# Patient Record
Sex: FEMALE | Race: WHITE | NOT HISPANIC OR LATINO | Employment: FULL TIME | URBAN - METROPOLITAN AREA
[De-identification: names, ages, dates, MRNs, and addresses within clinical notes are randomized per-mention and may not be internally consistent; named-entity substitution may affect disease eponyms.]

---

## 2021-11-01 ENCOUNTER — E-VISIT (OUTPATIENT)
Dept: FAMILY MEDICINE CLINIC | Facility: TELEHEALTH | Age: 31
End: 2021-11-01

## 2021-11-01 NOTE — E-VISIT ESCALATED
Patient escalated   Provider June Velazquez chose to escalate patient to another level of care because: Desired treatments not available   Patient was sent the following message:   Please schedule a video visit so appropriate treatment can be prescribed. just follow the link to set up a visit   What to do now:    Please set up a video visit  .   You won't be charged for your eVisit. If you paid with a credit card, the charge will be reversed.   Chief Complaint: Low back pain   Patient introduction   Patient is 31-year-old female reporting pain on both sides of lower back. Denies back pain is work-related. Denies currently receiving disability compensation related to their back pain. Denies an open lawsuit regarding their back pain.   Patient-submitted comments:   Patient writes: I think I have pulled / strained a muscle in my lower back. I felt the pain immediately after bending over and picking up my 40lb little boy. I am able to move a little but the pain is making me extremely uncomfortable. The act of moving from a laying to sitting up position is the worst so I've been sitting /sleeping propped up..   Patient did not request an excuse note.   General presentation   Low back pain reported for < 1 week. Patient reports severe pain that limits some everyday activities.   Patient describes feeling dull/aching pain. Pain is not worse at night or when supine. Patient suspects current symptoms were caused by heavy lifting, bending over, and a twisting motion. Regarding an additional suspected cause of their back pain, patient writes: I picked up my 40lb child. Alleviating factors include: resting/lying down and changing positions. Aggravating factors include: sitting for an extended period, standing for an extended period, physical activity/exercise, bending over, and straightening up.   Reports prior back pain but denies similarity to current symptoms. Last episode of low back pain reported to be 1-3 months ago.    Denies having had spinal/back procedures in last year.   Treatments tried for current symptoms:   Reports trying ice, heat, OTC oral analgesics, and OTC topical analgesics.   Reports trying acetaminophen.   Reports trying 1000 mg of ibuprofen bid.   Regarding an additional OTC pain medication tried, patient writes: Advil roll on.   Reports the following treatment(s) have been helpful: acetaminophen, ibuprofen, heat, and non-prescription topical pain relievers.   Patient denies the following red flags:    Pain along the spine or bony part of the back    Back pain > 6 weeks    Back pain due to significant trauma    Urinary incontinence, urinary retention, or fecal incontinence    Saddle anesthesia    History of osteoporosis    Abdominal and pelvic pain, GI symptoms, and urinary symptoms    Difficulty walking or staying upright as a result of back pain    Bacteremia, sepsis, or endocarditis    Hemodialysis within the last 2 months    Injection drug use in the last 6 months    Bilateral leg weakness    Fever, severe fatigue, unintentional weight loss, or drenching night sweats    History of cancer   Self-exam:    Patient denies radicular pain.    Patient can squat down and rise up to a standing position without difficulty (L4 motor screening).    Patient can walk on heels for 10 steps but they struggle to keep right toes up (L5 motor screening).    Patient can walk on toes for 10 steps without difficulty (S1 motor screening).   Prognosis and risk stratification:   Patient is not interested in physical therapy.   Pregnancy/menstrual status/breastfeeding:   Denies being pregnant. Denies breastfeeding. Regarding last menstrual period, patient writes: 10/25-10/31/2021.   Current medications   Reports taking Clarispray.   Medication allergies   None.   Medication contraindication review   Denies history of arrhythmias, heart block, and conduction disorders; ASA- or NSAID-induced asthma or urticaria; aspirin-exacerbated  respiratory disease (AERD); congestive heart failure; hyperthyroidism; recent CABG surgery; and recent myocardial infarction.   Past medical history   Immune conditions: Patient denies immunocompromising conditions.   Assessment:   Patient determined to need a level of care not appropriate to be delivered through eVisit.   Plan:   Patient informed of need to seek in-person care      ----------   Electronically signed by NEYMAR Gonzales on 2021-11-01 at 03:34AM   ----------   Patient Interview Transcript:   Where on your lower back do you feel pain? This interview treats low back pain only. Select one.    Both sides   Not selected:    Right side    Left side    Along the spine or bony part of my back   Since your back pain started, have you had any of these stomach- or bladder-related symptoms? Select all that apply.    None of these   Not selected:    Abdominal pain or discomfort    Nausea    Vomiting    Pain that's worse after eating    Pelvic or lower abdominal pain    Burning with urination    Frequent urination    Blood in your urine   Do any of these statements apply to you? Select all that apply.    None of the above   Not selected:    I've been taking oral steroids such as prednisone for a long time    I have a bruise or scrape on my spine where the pain is    I have osteoporosis   How long have you had your current episode of back pain? Select one.    Less than 1 week   Not selected:    1 to 2 weeks    3 to 4 weeks    5 to 6 weeks    More than 6 weeks   What does the pain feel like? Select one.    Dull, aching   Not selected:    Sharp, shooting, stabbing, or burning    Neither of the above   How severe is your back pain? Think about how the pain affects your everyday activities. On a scale of 1 to 10, for example, how difficult is it to get out of bed, get dressed, shower, or go to work or school? Select one.    Severe, 7 to 9; my pain is distressing, and it limits me from doing some everyday activities    Not selected:    Mild, 1 to 3; my pain is noticeable and distracting, but I am still able to do everyday activities    Moderate, 4 to 6; my pain is strong, and it makes everyday activities uncomfortable    Unbearable, 10+; my pain is so intense that it keeps me from doing almost all everyday activities   Does the pain travel down from your lower back to your buttock, thigh, lower leg, or foot? Select one.    No   Not selected:    Yes, it travels down my right side    Yes, it travels down my left side    Yes, it travels down both sides   Since your back pain started, have you had numbness or loss of feeling in the pattern as shown?    No   Not selected:    Yes    I have an underlying condition that causes chronic numbness or loss of feeling in that area   Does your back pain get worse at night or when you're lying down? Select one.    No   Not selected:    Yes   Do any of these make your back pain worse? Select all that apply.    Sitting in one position for a long time    Being on my feet for a long time    Physical activity or exercise    Bending over    Standing up from a bent over position   Not selected:    Twisting to the side    Leaning to the side    Coughing or sneezing    None of the above   Do any of these help your back feel better? Select all that apply.    Resting or lying down    Frequently changing positions   Not selected:    Physical activity or exercise    Stretching    None of the above   Have you tried any of these to relieve your low back pain? Select all that apply.    Ice    Heat    Non-prescription oral pain relievers (Advil, Aleve, aspirin, or Tylenol)    Non-prescription topical pain relievers (Icy Hot, BenGay, or Vicks VapoRub)   Not selected:    Massage    Acupuncture    Chiropractic treatment    None of the above   Since your back pain started, have you stumbled or fallen because of problems with balance or coordination? Select one.    No   Not selected:    Yes    I have an underlying  condition that prevents me from standing or walking    I have an underlying condition that causes problems with my balance or coordination   Along with your back pain, have you noticed a loss of strength in your legs? Select one.    No   Not selected:    Yes, but only in one leg    Yes, in both legs    I have limited or no strength in my legs because of a chronic underlying condition   Since your back pain started, have you had any of these symptoms? Back pain doesn't usually come with other symptoms that affect your whole body. Select all that apply.    None of these   Not selected:    Unexplained fever    Severe fatigue that doesn't improve with rest    Unintentional weight loss    Drenching night sweats   Since your back pain began, have you noticed any loss of bowel or bladder function? This includes urinating or having a bowel movement when you didn't mean to. It also includes feeling like you can't urinate or empty your bladder all the way. Select one.    No   Not selected:    Yes   Can you squat down and then rise to a standing position?    Yes, with ease   Not selected:    Yes, but my right leg seems weaker than my left leg    Yes, but my left leg seems weaker than my right leg    No, my right leg is too weak    No, my left leg is too weak    No, both my legs are too weak   Can you walk on your heels for at least 10 steps?    Yes, but it's hard to keep my right toes up   Not selected:    Yes, with ease    Yes, but it's hard to keep my left toes up    No, because I can't keep my right toes up high enough    No, because I can't keep my left toes up high enough    No, because I can't keep my toes up on both the right and left sides   Can you walk on your toes for at least 10 steps?    Yes, with ease   Not selected:    Yes, but it's harder to keep my right heel up    Yes, but it's harder to keep my left heel up    No, because I can't keep my right heel up high enough    No, because I can't keep my left heel up  "high enough    No, because I can't keep my heels up on both my right and left sides   The next few questions will ask you about what may have caused your back pain. Was your back pain triggered by any of these activities? Select all that apply.    Heavy lifting    Bending over    A twisting motion    Other (specify): I picked up my 40lb child   Not selected:    A new sport or activity    An awkward position    Intense exercise    No, nothing triggered my back pain   Is your back pain the result of a serious injury, fall, or accident? A serious injury or fall might include falling off a ladder more than 10 feet high or being involved in a car accident more serious than a \"fender molina.\" Select one.    No   Not selected:    Yes   Is your back pain work-related? This includes injuries suffered at work and symptoms caused by repetitive activities at work (such as overuse injuries). Select one.    No   Not selected:    Yes   Within the last year, have you had any medical procedures done on your spine or back? Examples include back or spine surgeries, spinal injections, epidural injections, and epidural catheter placement. Select one.    No   Not selected:    Yes   Have you recently been treated for bacteremia, sepsis, or endocarditis? Bacteremia is a condition where bacteria is found in the blood. Sepsis can be a serious, life-threatening complication of bacteremia. Endocarditis is an infection of the lining of the heart and heart valves. Select one.    No   Not selected:    Yes   Have you had hemodialysis within the last 2 months? Hemodialysis (also known as dialysis) is a treatment for kidney failure. Select one.    No   Not selected:    Yes   In the last 6 months, have you used any injection drugs, such as heroin, cocaine, crystal meth, amphetamines, or opiates? Using injection drugs can put you at risk for serious infections of the spine and surrounding tissues. Your response to this question will only be shared " with your health provider. Select one.    No   Not selected:    Yes   Have you had low back pain before? Select one.    Yes, but it felt different   Not selected:    Yes, and it felt similar    No   When was the last time you had low back pain? Select one.    1 to 3 months ago   Not selected:    Within the last month    4 to 6 months ago    More than 6 months ago   Are you currently receiving any disability compensation related to your back pain? If so, you may be instructed to follow up with your treating physician. Select one.    No   Not selected:    Yes   Are you currently involved in a lawsuit associated with your back pain? Have you filed any legal action regarding your back pain? If so, you may be instructed to follow up with your treating physician. Select one.    No   Not selected:    Yes   Are you pregnant? Select one.    No   Not selected:    Yes   When was your last menstrual period? If you don't currently have periods or no longer have periods, please briefly explain.    10/25-10/31/2021   Are you breastfeeding? Select one.    No   Not selected:    Yes   Do you have any of these conditions that can affect the immune system? Scroll to see all options. Select all that apply.    None of these   Not selected:    History of bone marrow transplant    Chronic kidney disease    Chronic liver disease (including cirrhosis)    HIV/AIDS    Inflammatory bowel disease (Crohn's disease or ulcerative colitis)    Lupus    Moderate to severe plaque psoriasis    Multiple sclerosis    Rheumatoid arthritis    Sickle cell anemia    Alpha or beta thalassemia    History of solid organ transplant (kidney, liver, or heart)    History of spleen removal    An autoimmune disorder not listed here    A condition requiring treatment with long-term use of oral steroids (such as prednisone, prednisolone, or dexamethasone)   Have you ever been diagnosed with cancer? Select one.    No   Not selected:    Yes, I have cancer now    Yes, but  I'm in remission   Have you had gastric bypass surgery? Select one.    No   Not selected:    Yes   Which of these non-prescription pain medications have you used for your symptoms? Select all that apply.    Acetaminophen (Tylenol)    Ibuprofen (Advil, Motrin)    Other (specify the medication and if it's been helpful): Advil roll on   Not selected:    Aspirin    Naproxen sodium (Aleve)    None of these   What dose of ibuprofen (Advil, Motrin) are you taking? The dose is the total number of milligrams you take each time. For example, if you take two 200 mg pills at a time, your dose is 400 mg. Select one.    1000 mg   Not selected:    81 mg    162 mg    325 mg    500 mg    650 mg    Other (please specify)   How often are you taking ibuprofen (Advil, Motrin)? Select one.    2 times a day   Not selected:    1 time a day    3 times a day    4 times a day    More than 4 times a day   Which of the treatments you've tried have been helpful? Select all that apply.    Acetaminophen (Tylenol)    Ibuprofen (Advil, Motrin)    Heat    Non-prescription topical pain relievers (Icy Hot, BenGay, or Vicks VapoRub)   Not selected:    Ice    Nothing I've tried has been helpful   Have you used any of these muscle relaxants for your symptoms? These are only available with a prescription. Select all that apply.    None of the above   Not selected:    Cyclobenzaprine hydrochloride (Flexeril)    Methocarbamol (Robaxin)    Tizanidine (Zanaflex)   If recommended by your provider, would you be willing to try physical therapy? Physical therapy may include hands-on therapy, strength and flexibility exercises, and posture training. We'll keep in mind your preferences when creating a treatment plan. Select all that apply.    No   Not selected:    Yes    Maybe, depending on the cost    Maybe, depending on the time commitment   Let's make sure the medications in your treatment plan are safe for you to take. Are you being treated for any of these  conditions? Select all that apply.    None of the above   Not selected:    Arrhythmias, heart block, or conduction disorders    Aspirin- or NSAID-induced asthma or urticaria    Aspirin-exacerbated respiratory disease (AERD)    Congestive heart failure    Hyperthyroidism    Recent coronary artery bypass graft (CABG) surgery    Recent heart attack   Have you taken any monoamine oxidase inhibitor (MAOI) medications in the last 14 days? Examples include rasagiline (Azilect), selegiline (Eldepryl, Zelapar), isocarboxazid (Marplan), phenelzine (Nardil), and tranylcypromine (Parnate). Select one.    No   Not selected:    Yes   Are you taking ciprofloxacin (Cipro) or fluvoxamine (Luvox)? Select one.    No   Not selected:    Yes   Are you taking any other medications or supplements? On the next screen, you need to list all vitamins, supplements, non-prescription medications (such as aspirin or Aleve), and prescription medications that you're taking. Select one.    Yes   Not selected:    Yes, but I'm not sure what they are    No   Have you ever had an allergic or bad reaction to any medication? Select one.    No   Not selected:    Yes   Do you need a doctor's note? A doctor's note confirms that you received care today and states when you can return to school or work. It does not contain information about your diagnosis or treatment plan. Your provider will make the final decision on whether to give you a doctor's note. Doctor's notes CANNOT be backdated. Select one.    No   Not selected:    Today only (1 day)    Today and tomorrow (2 days)    3 days   Is there anything else you'd like to tell us about your symptoms?    I think I have pulled / strained a muscle in my lower back. I felt the pain immediately after bending over and picking up my 40lb little boy. I am able to move a little but the pain is making me extremely uncomfortable. The act of moving from a laying to sitting up position is the worst so I've been sitting  /sleeping propped up.   ----------   Medical history   Medical history data does not currently exist for this patient.

## 2022-08-24 ENCOUNTER — OFFICE VISIT (OUTPATIENT)
Dept: OBSTETRICS AND GYNECOLOGY | Facility: CLINIC | Age: 32
End: 2022-08-24

## 2022-08-24 VITALS
HEIGHT: 64 IN | BODY MASS INDEX: 42.55 KG/M2 | HEART RATE: 85 BPM | SYSTOLIC BLOOD PRESSURE: 146 MMHG | WEIGHT: 249.2 LBS | DIASTOLIC BLOOD PRESSURE: 75 MMHG

## 2022-08-24 DIAGNOSIS — Z01.411 ENCOUNTER FOR GYNECOLOGICAL EXAMINATION WITH ABNORMAL FINDING: Primary | ICD-10-CM

## 2022-08-24 DIAGNOSIS — N92.1 MENORRHAGIA WITH IRREGULAR CYCLE: ICD-10-CM

## 2022-08-24 LAB
DEPRECATED RDW RBC AUTO: 41.6 FL (ref 37–54)
ERYTHROCYTE [DISTWIDTH] IN BLOOD BY AUTOMATED COUNT: 12.5 % (ref 12.3–15.4)
HBA1C MFR BLD: 8.4 % (ref 4.8–5.6)
HCT VFR BLD AUTO: 41.9 % (ref 34–46.6)
HGB BLD-MCNC: 13.6 G/DL (ref 12–15.9)
MCH RBC QN AUTO: 30 PG (ref 26.6–33)
MCHC RBC AUTO-ENTMCNC: 32.5 G/DL (ref 31.5–35.7)
MCV RBC AUTO: 92.3 FL (ref 79–97)
PLATELET # BLD AUTO: 317 10*3/MM3 (ref 140–450)
PMV BLD AUTO: 11.4 FL (ref 6–12)
RBC # BLD AUTO: 4.54 10*6/MM3 (ref 3.77–5.28)
WBC NRBC COR # BLD: 8.06 10*3/MM3 (ref 3.4–10.8)

## 2022-08-24 PROCEDURE — 87624 HPV HI-RISK TYP POOLED RSLT: CPT | Performed by: NURSE PRACTITIONER

## 2022-08-24 PROCEDURE — G0123 SCREEN CERV/VAG THIN LAYER: HCPCS | Performed by: NURSE PRACTITIONER

## 2022-08-24 PROCEDURE — 80053 COMPREHEN METABOLIC PANEL: CPT | Performed by: NURSE PRACTITIONER

## 2022-08-24 PROCEDURE — 2014F MENTAL STATUS ASSESS: CPT | Performed by: NURSE PRACTITIONER

## 2022-08-24 PROCEDURE — 84439 ASSAY OF FREE THYROXINE: CPT | Performed by: NURSE PRACTITIONER

## 2022-08-24 PROCEDURE — 83036 HEMOGLOBIN GLYCOSYLATED A1C: CPT | Performed by: NURSE PRACTITIONER

## 2022-08-24 PROCEDURE — 3008F BODY MASS INDEX DOCD: CPT | Performed by: NURSE PRACTITIONER

## 2022-08-24 PROCEDURE — 36415 COLL VENOUS BLD VENIPUNCTURE: CPT | Performed by: NURSE PRACTITIONER

## 2022-08-24 PROCEDURE — 85027 COMPLETE CBC AUTOMATED: CPT | Performed by: NURSE PRACTITIONER

## 2022-08-24 PROCEDURE — 99385 PREV VISIT NEW AGE 18-39: CPT | Performed by: NURSE PRACTITIONER

## 2022-08-24 PROCEDURE — 83002 ASSAY OF GONADOTROPIN (LH): CPT | Performed by: NURSE PRACTITIONER

## 2022-08-24 PROCEDURE — 83001 ASSAY OF GONADOTROPIN (FSH): CPT | Performed by: NURSE PRACTITIONER

## 2022-08-24 PROCEDURE — 84443 ASSAY THYROID STIM HORMONE: CPT | Performed by: NURSE PRACTITIONER

## 2022-08-24 PROCEDURE — 84270 ASSAY OF SEX HORMONE GLOBUL: CPT | Performed by: NURSE PRACTITIONER

## 2022-08-24 NOTE — PROGRESS NOTES
"  Well Woman Visit    CC: Scheduled annual well gyn visit  Chief Complaint   Patient presents with   • Gynecologic Exam     Wwe discuss irregular menses coming every two weeks        Myriad intake in the past?: No    NOT DONE TODAY due to: Patient is unsure of complete family history    Contraception:  Condoms  Social History     Substance and Sexual Activity   Sexual Activity Not Currently       HPI:   32 y.o.     Menses:   Has been bleeding about every two weeks for the past few months.  Cycle is heavy and painful most of the time.  Bled from 6/15/22 to 22, 22 to 22, 22 to 22 (spotting), 22 to 22.  Concerned for PCOS, also has new growth of facial hair.      Not currently using any birth control, has tried both OCPs and nuvaring in the past.  Does not like how it makes her feel.  No hormonal birth control since 2019.  Does not want any more children.  Has been unable to lose weight     Pain:  Moderate, would like to discuss tx options    PCP: does not have PCP  History: PMHx, Meds, Allergies, PSHx, Social Hx, and POBHx all reviewed and updated.    Pt has concerns she would like to discuss. Bleeding as detailed above    PHYSICAL EXAM:  /75 (BP Location: Left arm, Patient Position: Sitting, Cuff Size: Adult)   Pulse 85   Ht 162.6 cm (64\")   Wt 113 kg (249 lb 3.2 oz)   LMP 2022 (Exact Date)   Breastfeeding No   BMI 42.78 kg/m²  Not found.  General- NAD, alert and oriented, appropriate  Psych- Normal mood, good memory  Neck- No masses, no thyroid enlargement  CV- Regular rhythm, no murnurs  Resp- CTA to bases, no wheezes  Abdomen- Soft, non distended, non tender, no masses    Breast left-  Bilaterally symmetrical, no masses, non tender, no nipple discharge  Breast right- Bilaterally symmetrical, no masses, non tender, no nipple discharge    External genitalia- Normal female, no lesions  Urethra/meatus- Normal, no masses, non tender  Bladder- Normal, no masses, " non tender  Vagina- Normal, no atrophy, no lesions, no discharge.  Prolapse: No prolapse  Cvx- Normal, no lesions, no discharge, No cervical motion tenderness  Uterus- Normal size, shape & consistency.  Non tender, mobile, & no prolapse  Adnexa- No mass, non tender  Anus/Rectum/Perineum- Not performed    Lymphatic- No palpable neck, axillary, or groin nodes  Ext- No edema, no cyanosis    Skin- No lesions, no rashes, no acanthosis nigricans      ASSESSMENT and PLAN:    Diagnoses and all orders for this visit:    1. Encounter for gynecological examination with abnormal finding (Primary)  -     IgP, Aptima HPV  -     Ambulatory Referral to Family Practice    2. Menorrhagia with irregular cycle  Overview:  Patient is concerned for PCOS due to irregular cycles and new growth of facial hair.  Will order labs and ultrasound.   Will plan follow up after ultrasound to discuss management options.     Orders:  -     CBC (No Diff)  -     Comprehensive Metabolic Panel  -     Follicle Stimulating Hormone  -     Hemoglobin A1c  -     Luteinizing Hormone  -     Sex Horm Binding Globulin  -     T4, Free  -     TSH  -     US Non-ob Transvaginal; Future      Preventative:  • BREAST HEALTH- Monthly self breast exam importance and how to reviewed. MMG and/or MRI (prn) reviewed per society guidelines and her individual history. Screen: Not medically needed  • CERVICAL CANCER Screening- Reviewed current ASCCP guidelines for screening w and wo cotest HPV, age specific.  Screen: Updated today  • SEXUAL HEALTH: Declines STD screening  • Importance of WEIGHT MANAGEMENT, nutrition, and exercise reviewed  • VACCINATIONS Recommended: COVID and booster PRN, Flu annually, Gardisil/HPV vaccine (up to 44yo).  Importance discussed, risk being unvaccinated reviewed.  Questions answered  • Smoking status- NON SMOKER   • Will place referral to family medicine so patient can establish a medical home.    TRACK MENSES, RTO if <q21d, >7d long, heavy or  painful.    Will plan follow up after labs and ultrasound  She understands the importance of having any ordered tests to be performed in a timely fashion.  The risks of not performing them include, but are not limited to, advanced cancer stages, bone loss from osteoporosis and/or subsequent increase in morbidity and/or mortality.  She is encouraged to review her results online and/or contact or office if she has questions.     Follow Up:  Return in about 4 weeks (around 9/21/2022) for Telehealth follow up to review ultrasound and labs.    Mervin Carlos, APRN  08/24/2022    [facility] [place] [facility] [address]  [facility]  [address] [place] [address]  Dept: [phone]  Loc: [phone]

## 2022-08-25 ENCOUNTER — PATIENT MESSAGE (OUTPATIENT)
Dept: OBSTETRICS AND GYNECOLOGY | Facility: CLINIC | Age: 32
End: 2022-08-25

## 2022-08-25 LAB
ALBUMIN SERPL-MCNC: 4.3 G/DL (ref 3.5–5.2)
ALBUMIN/GLOB SERPL: 1.4 G/DL
ALP SERPL-CCNC: 80 U/L (ref 39–117)
ALT SERPL W P-5'-P-CCNC: 23 U/L (ref 1–33)
ANION GAP SERPL CALCULATED.3IONS-SCNC: 15.9 MMOL/L (ref 5–15)
AST SERPL-CCNC: 21 U/L (ref 1–32)
BILIRUB SERPL-MCNC: 0.3 MG/DL (ref 0–1.2)
BUN SERPL-MCNC: 12 MG/DL (ref 6–20)
BUN/CREAT SERPL: 19 (ref 7–25)
CALCIUM SPEC-SCNC: 9.2 MG/DL (ref 8.6–10.5)
CHLORIDE SERPL-SCNC: 99 MMOL/L (ref 98–107)
CO2 SERPL-SCNC: 21.1 MMOL/L (ref 22–29)
CREAT SERPL-MCNC: 0.63 MG/DL (ref 0.57–1)
EGFRCR SERPLBLD CKD-EPI 2021: 121.8 ML/MIN/1.73
FSH SERPL-ACNC: 6.04 MIU/ML
GLOBULIN UR ELPH-MCNC: 3 GM/DL
GLUCOSE SERPL-MCNC: 235 MG/DL (ref 65–99)
LH SERPL-ACNC: 18.1 MIU/ML
POTASSIUM SERPL-SCNC: 4.5 MMOL/L (ref 3.5–5.2)
PROT SERPL-MCNC: 7.3 G/DL (ref 6–8.5)
SODIUM SERPL-SCNC: 136 MMOL/L (ref 136–145)
T4 FREE SERPL-MCNC: 1.29 NG/DL (ref 0.93–1.7)
TSH SERPL DL<=0.05 MIU/L-ACNC: 2.03 UIU/ML (ref 0.27–4.2)

## 2022-08-26 LAB — SHBG SERPL-SCNC: 15.5 NMOL/L (ref 24.6–122)

## 2022-08-29 LAB
CYTOLOGIST CVX/VAG CYTO: NORMAL
CYTOLOGY CVX/VAG DOC CYTO: NORMAL
CYTOLOGY CVX/VAG DOC THIN PREP: NORMAL
DX ICD CODE: NORMAL
HIV 1 & 2 AB SER-IMP: NORMAL
HPV I/H RISK 4 DNA CVX QL PROBE+SIG AMP: NEGATIVE
OTHER STN SPEC: NORMAL
STAT OF ADQ CVX/VAG CYTO-IMP: NORMAL

## 2022-08-30 ENCOUNTER — OFFICE VISIT (OUTPATIENT)
Dept: FAMILY MEDICINE CLINIC | Facility: CLINIC | Age: 32
End: 2022-08-30

## 2022-08-30 VITALS
TEMPERATURE: 97.5 F | SYSTOLIC BLOOD PRESSURE: 136 MMHG | WEIGHT: 245 LBS | HEART RATE: 127 BPM | DIASTOLIC BLOOD PRESSURE: 80 MMHG | OXYGEN SATURATION: 97 % | HEIGHT: 64 IN | BODY MASS INDEX: 41.83 KG/M2

## 2022-08-30 DIAGNOSIS — E11.43 TYPE 2 DIABETES MELLITUS WITH DIABETIC AUTONOMIC NEUROPATHY, WITHOUT LONG-TERM CURRENT USE OF INSULIN: Primary | ICD-10-CM

## 2022-08-30 DIAGNOSIS — F41.9 ANXIETY: ICD-10-CM

## 2022-08-30 PROCEDURE — 99204 OFFICE O/P NEW MOD 45 MIN: CPT | Performed by: NURSE PRACTITIONER

## 2022-08-30 RX ORDER — HYDROXYZINE HYDROCHLORIDE 10 MG/1
10 TABLET, FILM COATED ORAL 3 TIMES DAILY PRN
Qty: 90 TABLET | Refills: 1 | Status: SHIPPED | OUTPATIENT
Start: 2022-08-30 | End: 2022-09-23 | Stop reason: SDUPTHER

## 2022-08-30 RX ORDER — BLOOD-GLUCOSE METER
EACH MISCELLANEOUS
Qty: 1 KIT | Refills: 0 | Status: SHIPPED | OUTPATIENT
Start: 2022-08-30

## 2022-08-30 RX ORDER — LANCETS
EACH MISCELLANEOUS
Qty: 100 EACH | Refills: 3 | Status: SHIPPED | OUTPATIENT
Start: 2022-08-30

## 2022-08-30 RX ORDER — FLUOXETINE 10 MG/1
10 CAPSULE ORAL DAILY
Qty: 30 CAPSULE | Refills: 1 | Status: SHIPPED | OUTPATIENT
Start: 2022-08-30 | End: 2022-09-23 | Stop reason: SDUPTHER

## 2022-08-30 NOTE — PROGRESS NOTES
"Chief Complaint  Diabetes (Needs labs, A1c) and Anxiety (Having panic attacks)    PHQ-9 Total Score: 19    Subjective        Past Medical History:   Diagnosis Date   • Gestational diabetes    • Heart murmur Birth    Cleared up and released from cardiologist at 18 years old   • Iron deficiency    • Kidney stone 08/2016    Have had 3 kidney stones     Social History     Tobacco Use   • Smoking status: Never Smoker   • Smokeless tobacco: Never Used   Vaping Use   • Vaping Use: Never used   Substance Use Topics   • Alcohol use: Not Currently     Comment: Social maybe 2 drinks a month   • Drug use: Never      No current outpatient medications on file prior to visit.     No current facility-administered medications on file prior to visit.      No Known Allergies   Health Maintenance Due   Topic Date Due   • ANNUAL PHYSICAL  Never done   • TDAP/TD VACCINES (1 - Tdap) Never done   • HEPATITIS C SCREENING  Never done   • COVID-19 Vaccine (3 - Booster for Moderna series) 01/22/2022      Irlanda Maradiaga presents to [facility]  Here to follow up on abnormal labs.     DM: pt states her mother is type 1 diabetic and maternal grandfather with hx of type 1 diabetes. Pt states she had gestational diabetes, last pregnancy was July 6, 2017.     Anxiety: increased stress with concerns for Autism and has upcoming testing at [facility]. Pt notes difficulty sleeping, nervous ticks (biting nails and inside of cheek), notes frequent worry, more emotional. Tried CBD oil and improved for a short time but no longer using. Notes feeling heart racing, feels clammy, and  will help calm her down with hug. Notes worsening of memory.      Pt states she has a follow up with NEYMAR Davis with Gyn on 9/21/22.       Objective   Vital Signs:   /80 (BP Location: Left arm)   Pulse (!) 127   Temp 97.5 °F (36.4 °C)   Ht 161.3 cm (63.5\")   Wt 111 kg (245 lb)   SpO2 97%   BMI 42.72 kg/m² "     Review of Systems   Neurological: Negative for numbness.      Physical Exam  Vitals reviewed.   Constitutional:       General: She is not in acute distress.     Appearance: Normal appearance. She is well-developed.   Eyes:      Conjunctiva/sclera: Conjunctivae normal.      Pupils: Pupils are equal, round, and reactive to light.   Cardiovascular:      Rate and Rhythm: Normal rate and regular rhythm.      Heart sounds: Normal heart sounds.   Pulmonary:      Effort: Pulmonary effort is normal.      Breath sounds: Normal breath sounds.   Musculoskeletal:      Right lower leg: No edema.      Left lower leg: No edema.   Skin:     General: Skin is warm and dry.   Neurological:      Mental Status: She is alert and oriented to person, place, and time.   Psychiatric:         Mood and Affect: Mood and affect normal.         Behavior: Behavior normal.         Thought Content: Thought content normal.         Judgment: Judgment normal.        Result Review :   The following data was reviewed by: NEYMAR Bah on 08/30/2022:  Common labs    Common Labsle 8/24/22 8/24/22 8/24/22    0927 0927 0927   Glucose   235 (A)   BUN   12   Creatinine   0.63   Sodium   136   Potassium   4.5   Chloride   99   Calcium   9.2   Albumin   4.30   Total Bilirubin   0.3   Alkaline Phosphatase   80   AST (SGOT)   21   ALT (SGPT)   23   WBC  8.06    Hemoglobin  13.6    Hematocrit  41.9    Platelets  317    Hemoglobin A1C 8.40 (A)     (A) Abnormal value            TSH    TSH 8/24/22   TSH 2.030           Data reviewed: Consultant notes NEYMAR Davis GYN          Assessment and Plan    Diagnoses and all orders for this visit:    1. Type 2 diabetes mellitus with diabetic autonomic neuropathy, without long-term current use of insulin (HCC) (Primary)  -     metFORMIN (Glucophage) 500 MG tablet; Take 1 tablet by mouth 2 (Two) Times a Day With Meals. Start with 1 tablet daily x 1 week then increase to 1 tablet twice daily  Dispense: 60  tablet; Refill: 2  -     glucose blood (Accu-Chek Nara Plus) test strip; Use as instructed to monitor glucose once daily  Dispense: 100 each; Refill: 3  -     Blood Glucose Monitoring Suppl (Accu-Chek Nara Plus) w/Device kit; Use as directed to check glucose once daily  Dispense: 1 kit; Refill: 0  -     Lancets (accu-chek multiclix) lancets; Use as directed to monitor glucose once daily  Dispense: 100 each; Refill: 3    2. Anxiety  -     FLUoxetine (PROzac) 10 MG capsule; Take 1 capsule by mouth Daily.  Dispense: 30 capsule; Refill: 1  -     hydrOXYzine (ATARAX) 10 MG tablet; Take 1 tablet by mouth 3 (Three) Times a Day As Needed for Anxiety.  Dispense: 90 tablet; Refill: 1        Follow Up   Return in about 4 weeks (around 9/27/2022) for Recheck.  Patient was given instructions and counseling regarding her condition or for health maintenance advice. Please see specific information pulled into the AVS if appropriate.

## 2022-09-02 ENCOUNTER — PATIENT ROUNDING (BHMG ONLY) (OUTPATIENT)
Dept: FAMILY MEDICINE CLINIC | Facility: CLINIC | Age: 32
End: 2022-09-02

## 2022-09-02 NOTE — PROGRESS NOTES
"September 2, 2022    Hello, may I speak with Irlanda Maradiaga?    My name is Claire Hoskins      I am  with [facility]  [address] [place] [address] [address] [address] [phone].    Before we get started may I verify your date of birth? 1990    I am calling to officially welcome you to our practice and ask about your recent visit. Is this a good time to talk? yes    Tell me about your visit with us. What things went well?  \"I came in early to the appointment and was seen early.\"       We're always looking for ways to make our patients' experiences even better. Do you have recommendations on ways we may improve?  no    Overall were you satisfied with your first visit to our practice? yes       I appreciate you taking the time to speak with me today. Is there anything else I can do for you? no      Thank you, and have a great day.      "

## 2022-09-08 ENCOUNTER — HOSPITAL ENCOUNTER (OUTPATIENT)
Dept: ULTRASOUND IMAGING | Facility: HOSPITAL | Age: 32
Discharge: HOME | End: 2022-09-08
Admitting: NURSE PRACTITIONER

## 2022-09-08 DIAGNOSIS — N92.1 MENORRHAGIA WITH IRREGULAR CYCLE: ICD-10-CM

## 2022-09-08 PROCEDURE — 76830 TRANSVAGINAL US NON-OB: CPT

## 2022-09-21 ENCOUNTER — OFFICE VISIT (OUTPATIENT)
Dept: OBSTETRICS AND GYNECOLOGY | Facility: CLINIC | Age: 32
End: 2022-09-21

## 2022-09-21 DIAGNOSIS — N92.1 MENORRHAGIA WITH IRREGULAR CYCLE: Primary | ICD-10-CM

## 2022-09-21 PROCEDURE — 99442 PR PHYS/QHP TELEPHONE EVALUATION 11-20 MIN: CPT | Performed by: NURSE PRACTITIONER

## 2022-09-21 NOTE — PROGRESS NOTES
GYN Visit    CC:   Chief Complaint   Patient presents with   • Follow-up       HPI:   32 y.o. Contraception or HRT: Contraception:  Natural family planning, Contraception:  Condoms    You have chosen to receive care through a telephone visit. Do you consent to use a telephone visit for your medical care today? Yes    Telehealth visit to review labs and ultrasound.  Discussed ultrasound is normal.  Reviewed A1C of 8.4.  Patient states has established care with a PCP, changed her diet, started metformin and has lost almost 20 pounds.  Feeling much better and cycle has normalized.      History: PMHx, Meds, Allergies, PSHx, Social Hx, and POBHx all reviewed and updated.    Review of Systems   Constitutional: Negative.    HENT: Negative.    Eyes: Negative.    Respiratory: Negative.    Cardiovascular: Negative.    Gastrointestinal: Negative.    Endocrine: Negative.    Genitourinary: Negative.    Musculoskeletal: Negative.    Skin: Negative.    Allergic/Immunologic: Negative.         No new allergies.   Neurological: Negative.    Hematological: Negative.    Psychiatric/Behavioral: Negative.        PHYSICAL EXAM:  There were no vitals taken for this visit.     Physical Exam - no physical exam, telehealth visit    ASSESSMENT AND PLAN:  Diagnoses and all orders for this visit:    1. Menorrhagia with irregular cycle (Primary)  Overview:  Patient is concerned for PCOS due to irregular cycles and new growth of facial hair.  Will order labs and ultrasound.   Will plan follow up after ultrasound to discuss management options.     22 - cycle has normalized as patient is working on controlling her blood glucose levels.  Ultrasound normal.        Counseling:  • TRACK MENSES, RTO if <q21d, >7d long, heavy or painful.    • Return as needed    Follow Up:  Return as needed.    This visit has been rescheduled as a phone visit to comply with patient safety concerns in accordance with CDC recommendations. Total time of  discussion was 11 minutes.        Mervin Carlos, APRN  09/21/2022    [facility] [address]  [facility]  [address] [address]  Dept: [phone]  Loc: [phone]

## 2022-09-23 ENCOUNTER — OFFICE VISIT (OUTPATIENT)
Dept: FAMILY MEDICINE CLINIC | Facility: CLINIC | Age: 32
End: 2022-09-23

## 2022-09-23 VITALS
OXYGEN SATURATION: 97 % | BODY MASS INDEX: 40.8 KG/M2 | SYSTOLIC BLOOD PRESSURE: 128 MMHG | TEMPERATURE: 97.8 F | DIASTOLIC BLOOD PRESSURE: 80 MMHG | HEIGHT: 64 IN | HEART RATE: 116 BPM | WEIGHT: 239 LBS

## 2022-09-23 DIAGNOSIS — F41.9 ANXIETY: ICD-10-CM

## 2022-09-23 DIAGNOSIS — E11.43 TYPE 2 DIABETES MELLITUS WITH DIABETIC AUTONOMIC NEUROPATHY, WITHOUT LONG-TERM CURRENT USE OF INSULIN: Primary | ICD-10-CM

## 2022-09-23 DIAGNOSIS — Z11.59 NEED FOR HEPATITIS C SCREENING TEST: ICD-10-CM

## 2022-09-23 DIAGNOSIS — E11.9 ENCOUNTER FOR DIABETIC FOOT EXAM: ICD-10-CM

## 2022-09-23 DIAGNOSIS — K59.00 CONSTIPATION, UNSPECIFIED CONSTIPATION TYPE: ICD-10-CM

## 2022-09-23 PROBLEM — Z82.79 FAMILY HISTORY OF CONGENITAL HEART DEFECT: Status: ACTIVE | Noted: 2017-02-20

## 2022-09-23 PROCEDURE — 99214 OFFICE O/P EST MOD 30 MIN: CPT | Performed by: NURSE PRACTITIONER

## 2022-09-23 RX ORDER — HYDROXYZINE HYDROCHLORIDE 10 MG/1
10 TABLET, FILM COATED ORAL 3 TIMES DAILY PRN
Qty: 270 TABLET | Refills: 1 | Status: SHIPPED | OUTPATIENT
Start: 2022-09-23

## 2022-09-23 RX ORDER — FLUOXETINE HYDROCHLORIDE 20 MG/1
20 CAPSULE ORAL DAILY
Qty: 90 CAPSULE | Refills: 1 | Status: SHIPPED | OUTPATIENT
Start: 2022-09-23 | End: 2023-03-02 | Stop reason: SDUPTHER

## 2022-09-23 NOTE — PROGRESS NOTES
Answers for HPI/ROS submitted by the patient on 9/16/2022  Please describe your symptoms.: Diabetes and anxiety  Have you had these symptoms before?: Yes  How long have you been having these symptoms?: Greater than 2 weeks  Please list any medications you are currently taking for this condition.: Prozac hydroxyzine and metformin  What is the primary reason for your visit?: Other    Chief Complaint  Anxiety (Follow up to medications)    Subjective        Past Medical History:   Diagnosis Date   • Gestational diabetes    • Heart murmur Birth    Cleared up and released from cardiologist at 18 years old   • Iron deficiency    • Kidney stone 08/2016    Have had 3 kidney stones     Social History     Tobacco Use   • Smoking status: Never Smoker   • Smokeless tobacco: Never Used   Vaping Use   • Vaping Use: Never used   Substance Use Topics   • Alcohol use: Not Currently     Comment: Social maybe 2 drinks a month   • Drug use: Never      Current Outpatient Medications on File Prior to Visit   Medication Sig   • Blood Glucose Monitoring Suppl (Accu-Chek Nara Plus) w/Device kit Use as directed to check glucose once daily   • glucose blood (Accu-Chek Nara Plus) test strip Use as instructed to monitor glucose once daily   • Lancets (accu-chek multiclix) lancets Use as directed to monitor glucose once daily   • metFORMIN (Glucophage) 500 MG tablet Take 1 tablet by mouth 2 (Two) Times a Day With Meals. Start with 1 tablet daily x 1 week then increase to 1 tablet twice daily   • [DISCONTINUED] FLUoxetine (PROzac) 10 MG capsule Take 1 capsule by mouth Daily.   • [DISCONTINUED] hydrOXYzine (ATARAX) 10 MG tablet Take 1 tablet by mouth 3 (Three) Times a Day As Needed for Anxiety.     No current facility-administered medications on file prior to visit.      No Known Allergies   Health Maintenance Due   Topic Date Due   • URINE MICROALBUMIN  Never done   • ANNUAL PHYSICAL  Never done   • Pneumococcal Vaccine 0-64 (1 - PCV) Never done  "  • Hepatitis B (1 of 3 - Risk 3-dose series) Never done   • TDAP/TD VACCINES (1 - Tdap) Never done   • COVID-19 Vaccine (3 - Booster for Moderna series) 10/17/2021   • HEPATITIS C SCREENING  Never done   • DIABETIC EYE EXAM  Never done      Irlanda Maradiaga presents to [facility]  History of Present Illness  Here to follow up on new start of Prozac.     Anxiety: At first felt like the medication was helping and now not as much. Notes vivid dreams but not nightmares.  has noted more relaxed. Feels more calm. Taking hydroxyzine about 2x/day and helps mind relax at night to fall asleep. Denies drowsiness with hydroxyzine.     Fasting glucose is now 118-120; prior to metformin it was >200. Eating lower carb.     Constipation: Patient notes that she has been more constipated with recent diet changes.  Plans to start a stool softener.  Discussed with patient that she can take 17 g of MiraLAX daily to help with constipation.    Followed up with NEYMAR Dias and states her menstrual cycle has returned to normal since making dietary changes. Reproductive organs were normal. Walking 2miles or 1 hour/day.       Objective   Vital Signs:   /80 (BP Location: Left arm)   Pulse 116   Temp 97.8 °F (36.6 °C)   Ht 161.3 cm (63.5\")   Wt 108 kg (239 lb)   SpO2 97%   BMI 41.67 kg/m²     Review of Systems   Physical Exam  Vitals reviewed.   Constitutional:       General: She is not in acute distress.     Appearance: Normal appearance. She is well-developed.   HENT:      Head: Normocephalic and atraumatic.      Right Ear: External ear normal.      Left Ear: External ear normal.   Eyes:      Conjunctiva/sclera: Conjunctivae normal.      Pupils: Pupils are equal, round, and reactive to light.   Cardiovascular:      Rate and Rhythm: Normal rate and regular rhythm.      Pulses:           Dorsalis pedis pulses are 2+ on the right side and 2+ on the left side.      Heart sounds: Normal " heart sounds.   Pulmonary:      Effort: Pulmonary effort is normal.      Breath sounds: Normal breath sounds.   Musculoskeletal:      Cervical back: Neck supple.      Right lower leg: No edema.      Left lower leg: No edema.      Right foot: No bunion.      Left foot: No bunion.   Feet:      Right foot:      Protective Sensation: 9 sites tested. 9 sites sensed.      Skin integrity: Skin integrity normal. No ulcer, blister or callus.      Toenail Condition: Right toenails are normal.      Left foot:      Protective Sensation: 9 sites tested. 9 sites sensed.      Skin integrity: Skin integrity normal. No ulcer, blister or callus.      Toenail Condition: Left toenails are normal.      Comments:      Skin:     General: Skin is warm and dry.   Neurological:      Mental Status: She is alert and oriented to person, place, and time.   Psychiatric:         Mood and Affect: Mood and affect normal.         Behavior: Behavior normal.         Thought Content: Thought content normal.         Judgment: Judgment normal.        Result Review :   The following data was reviewed by: NEYMAR Bah on 09/23/2022:  TSH    TSH 8/24/22   TSH 2.030           Most Recent A1C    HGBA1C Most Recent 8/24/22   Hemoglobin A1C 8.40 (A)   (A) Abnormal value                      Assessment and Plan    Diagnoses and all orders for this visit:    1. Type 2 diabetes mellitus with diabetic autonomic neuropathy, without long-term current use of insulin (HCC) (Primary)  -     CBC & Differential; Future  -     Comprehensive Metabolic Panel; Future  -     Hemoglobin A1c; Future  -     Lipid Panel; Future  -     MicroAlbumin, Urine, Random - Urine, Clean Catch; Future    2. Anxiety  -     FLUoxetine (PROzac) 20 MG capsule; Take 1 capsule by mouth Daily.  Dispense: 90 capsule; Refill: 1  -     hydrOXYzine (ATARAX) 10 MG tablet; Take 1 tablet by mouth 3 (Three) Times a Day As Needed for Anxiety.  Dispense: 270 tablet; Refill: 1    3. Encounter for  diabetic foot exam (HCC)    4. Need for hepatitis C screening test  -     Hepatitis C Antibody; Future    5. Constipation, unspecified constipation type    Patient to have fasting labs drawn in about 2 months and then follow-up in the office with provider to review labs and obtain refills of medications.  Patient states she plans to get an updated eye exam.    Follow Up   Return in about 2 months (around 11/23/2022).  Patient was given instructions and counseling regarding her condition or for health maintenance advice. Please see specific information pulled into the AVS if appropriate.

## 2022-10-29 DIAGNOSIS — F41.9 ANXIETY: ICD-10-CM

## 2022-10-29 DIAGNOSIS — E11.43 TYPE 2 DIABETES MELLITUS WITH DIABETIC AUTONOMIC NEUROPATHY, WITHOUT LONG-TERM CURRENT USE OF INSULIN: ICD-10-CM

## 2022-11-01 RX ORDER — FLUOXETINE 10 MG/1
10 CAPSULE ORAL DAILY
Qty: 30 CAPSULE | Refills: 1 | OUTPATIENT
Start: 2022-11-01

## 2022-12-09 ENCOUNTER — CLINICAL SUPPORT (OUTPATIENT)
Dept: FAMILY MEDICINE CLINIC | Facility: CLINIC | Age: 32
End: 2022-12-09

## 2022-12-09 DIAGNOSIS — Z11.59 NEED FOR HEPATITIS C SCREENING TEST: ICD-10-CM

## 2022-12-09 DIAGNOSIS — E11.43 TYPE 2 DIABETES MELLITUS WITH DIABETIC AUTONOMIC NEUROPATHY, WITHOUT LONG-TERM CURRENT USE OF INSULIN: ICD-10-CM

## 2022-12-09 LAB
ALBUMIN SERPL-MCNC: 4.4 G/DL (ref 3.5–5.2)
ALBUMIN UR-MCNC: <1.2 MG/DL
ALBUMIN/GLOB SERPL: 1.7 G/DL
ALP SERPL-CCNC: 86 U/L (ref 39–117)
ALT SERPL W P-5'-P-CCNC: 35 U/L (ref 1–33)
ANION GAP SERPL CALCULATED.3IONS-SCNC: 11 MMOL/L (ref 5–15)
AST SERPL-CCNC: 19 U/L (ref 1–32)
BASOPHILS # BLD AUTO: 0.05 10*3/MM3 (ref 0–0.2)
BASOPHILS NFR BLD AUTO: 0.6 % (ref 0–1.5)
BILIRUB SERPL-MCNC: 0.3 MG/DL (ref 0–1.2)
BUN SERPL-MCNC: 8 MG/DL (ref 6–20)
BUN/CREAT SERPL: 12.3 (ref 7–25)
CALCIUM SPEC-SCNC: 9.2 MG/DL (ref 8.6–10.5)
CHLORIDE SERPL-SCNC: 99 MMOL/L (ref 98–107)
CHOLEST SERPL-MCNC: 208 MG/DL (ref 0–200)
CO2 SERPL-SCNC: 27 MMOL/L (ref 22–29)
CREAT SERPL-MCNC: 0.65 MG/DL (ref 0.57–1)
DEPRECATED RDW RBC AUTO: 40.9 FL (ref 37–54)
EGFRCR SERPLBLD CKD-EPI 2021: 120.9 ML/MIN/1.73
EOSINOPHIL # BLD AUTO: 0.12 10*3/MM3 (ref 0–0.4)
EOSINOPHIL NFR BLD AUTO: 1.4 % (ref 0.3–6.2)
ERYTHROCYTE [DISTWIDTH] IN BLOOD BY AUTOMATED COUNT: 12.4 % (ref 12.3–15.4)
GLOBULIN UR ELPH-MCNC: 2.6 GM/DL
GLUCOSE SERPL-MCNC: 112 MG/DL (ref 65–99)
HBA1C MFR BLD: 6.6 % (ref 4.8–5.6)
HCT VFR BLD AUTO: 38.3 % (ref 34–46.6)
HCV AB SER DONR QL: NORMAL
HDLC SERPL-MCNC: 40 MG/DL (ref 40–60)
HGB BLD-MCNC: 13.2 G/DL (ref 12–15.9)
IMM GRANULOCYTES # BLD AUTO: 0.04 10*3/MM3 (ref 0–0.05)
IMM GRANULOCYTES NFR BLD AUTO: 0.5 % (ref 0–0.5)
LDLC SERPL CALC-MCNC: 146 MG/DL (ref 0–100)
LDLC/HDLC SERPL: 3.59 {RATIO}
LYMPHOCYTES # BLD AUTO: 2.01 10*3/MM3 (ref 0.7–3.1)
LYMPHOCYTES NFR BLD AUTO: 23.2 % (ref 19.6–45.3)
MCH RBC QN AUTO: 31.1 PG (ref 26.6–33)
MCHC RBC AUTO-ENTMCNC: 34.5 G/DL (ref 31.5–35.7)
MCV RBC AUTO: 90.1 FL (ref 79–97)
MONOCYTES # BLD AUTO: 0.62 10*3/MM3 (ref 0.1–0.9)
MONOCYTES NFR BLD AUTO: 7.2 % (ref 5–12)
NEUTROPHILS NFR BLD AUTO: 5.81 10*3/MM3 (ref 1.7–7)
NEUTROPHILS NFR BLD AUTO: 67.1 % (ref 42.7–76)
NRBC BLD AUTO-RTO: 0 /100 WBC (ref 0–0.2)
PLATELET # BLD AUTO: 272 10*3/MM3 (ref 140–450)
PMV BLD AUTO: 11.1 FL (ref 6–12)
POTASSIUM SERPL-SCNC: 4.2 MMOL/L (ref 3.5–5.2)
PROT SERPL-MCNC: 7 G/DL (ref 6–8.5)
RBC # BLD AUTO: 4.25 10*6/MM3 (ref 3.77–5.28)
SODIUM SERPL-SCNC: 137 MMOL/L (ref 136–145)
TRIGL SERPL-MCNC: 123 MG/DL (ref 0–150)
VLDLC SERPL-MCNC: 22 MG/DL (ref 5–40)
WBC NRBC COR # BLD: 8.65 10*3/MM3 (ref 3.4–10.8)

## 2022-12-09 PROCEDURE — 80061 LIPID PANEL: CPT | Performed by: NURSE PRACTITIONER

## 2022-12-09 PROCEDURE — 85025 COMPLETE CBC W/AUTO DIFF WBC: CPT | Performed by: NURSE PRACTITIONER

## 2022-12-09 PROCEDURE — 82043 UR ALBUMIN QUANTITATIVE: CPT | Performed by: NURSE PRACTITIONER

## 2022-12-09 PROCEDURE — 83036 HEMOGLOBIN GLYCOSYLATED A1C: CPT | Performed by: NURSE PRACTITIONER

## 2022-12-09 PROCEDURE — 36415 COLL VENOUS BLD VENIPUNCTURE: CPT

## 2022-12-09 PROCEDURE — 80053 COMPREHEN METABOLIC PANEL: CPT | Performed by: NURSE PRACTITIONER

## 2022-12-09 PROCEDURE — 86803 HEPATITIS C AB TEST: CPT | Performed by: NURSE PRACTITIONER

## 2022-12-09 NOTE — PROGRESS NOTES
Venipuncture Blood Specimen Collection  Venipuncture performed in left arm by Sandi Calderon with good hemostasis. Patient tolerated the procedure well without complications.   12/09/22   Sandi Calderon

## 2023-02-25 DIAGNOSIS — E11.43 TYPE 2 DIABETES MELLITUS WITH DIABETIC AUTONOMIC NEUROPATHY, WITHOUT LONG-TERM CURRENT USE OF INSULIN: ICD-10-CM

## 2023-02-25 DIAGNOSIS — F41.9 ANXIETY: ICD-10-CM

## 2023-02-25 RX ORDER — FLUOXETINE HYDROCHLORIDE 20 MG/1
20 CAPSULE ORAL DAILY
Qty: 90 CAPSULE | Refills: 1 | OUTPATIENT
Start: 2023-02-25

## 2023-03-02 ENCOUNTER — OFFICE VISIT (OUTPATIENT)
Dept: FAMILY MEDICINE CLINIC | Facility: CLINIC | Age: 33
End: 2023-03-02
Payer: COMMERCIAL

## 2023-03-02 VITALS
HEART RATE: 70 BPM | TEMPERATURE: 97.3 F | SYSTOLIC BLOOD PRESSURE: 122 MMHG | WEIGHT: 222 LBS | OXYGEN SATURATION: 100 % | BODY MASS INDEX: 38.71 KG/M2 | DIASTOLIC BLOOD PRESSURE: 78 MMHG

## 2023-03-02 DIAGNOSIS — F41.9 ANXIETY: ICD-10-CM

## 2023-03-02 DIAGNOSIS — E11.43 TYPE 2 DIABETES MELLITUS WITH DIABETIC AUTONOMIC NEUROPATHY, WITHOUT LONG-TERM CURRENT USE OF INSULIN: ICD-10-CM

## 2023-03-02 PROCEDURE — 99214 OFFICE O/P EST MOD 30 MIN: CPT | Performed by: NURSE PRACTITIONER

## 2023-03-02 RX ORDER — FLUOXETINE HYDROCHLORIDE 20 MG/1
20 CAPSULE ORAL DAILY
Qty: 90 CAPSULE | Refills: 1 | Status: SHIPPED | OUTPATIENT
Start: 2023-03-02

## 2023-03-02 RX ORDER — FLUOXETINE 10 MG/1
10 CAPSULE ORAL DAILY
Qty: 90 CAPSULE | Refills: 1 | Status: SHIPPED | OUTPATIENT
Start: 2023-03-02

## 2023-03-02 NOTE — PROGRESS NOTES
Chief Complaint  Med Refill, Anxiety, and Diabetes    PHQ-2 Total Score: 1    Subjective        Past Medical History:   Diagnosis Date   • Anxiety 08/2022   • Gestational diabetes    • Heart murmur Birth    Cleared up and released from cardiologist at 18 years old   • Iron deficiency    • Kidney stone 08/2016    Have had 3 kidney stones     Social History     Tobacco Use   • Smoking status: Never   • Smokeless tobacco: Never   Vaping Use   • Vaping Use: Never used   Substance Use Topics   • Alcohol use: Not Currently     Comment: Social maybe 2 drinks a month   • Drug use: Never      Current Outpatient Medications on File Prior to Visit   Medication Sig   • Blood Glucose Monitoring Suppl (Accu-Chek Nara Plus) w/Device kit Use as directed to check glucose once daily   • glucose blood (Accu-Chek Nara Plus) test strip Use as instructed to monitor glucose once daily   • Lancets (accu-chek multiclix) lancets Use as directed to monitor glucose once daily   • [DISCONTINUED] FLUoxetine (PROzac) 20 MG capsule Take 1 capsule by mouth Daily.   • [DISCONTINUED] metFORMIN (GLUCOPHAGE) 500 MG tablet Take 1 tablet by mouth 2 (Two) Times a Day With Meals.   • hydrOXYzine (ATARAX) 10 MG tablet Take 1 tablet by mouth 3 (Three) Times a Day As Needed for Anxiety.     No current facility-administered medications on file prior to visit.      No Known Allergies   Health Maintenance Due   Topic Date Due   • Hepatitis B (1 of 3 - 3-dose series) Never done   • Pneumococcal Vaccine 0-64 (1 - PCV) Never done   • TDAP/TD VACCINES (1 - Tdap) Never done   • COVID-19 Vaccine (3 - Booster for Moderna series) 10/17/2021   • ANNUAL PHYSICAL  Never done   • DIABETIC EYE EXAM  Never done   • INFLUENZA VACCINE  Never done      Irlanda Maradiaga presents to [facility]  History of Present Illness  Here to follow up on anxiety and diabetes.     Increased stress with moving and new puppy. Appraisal tomorrow on old home.  Waking up with sweaty legs. Pt has noted improved anxiety on Prozac but symptoms have been returning with the recent increase. Difficulty sleeping and will lie there and think. Denies feeling depressed. Increased fatigue and decreased motivation. Feels overwhelmed. Irritable.     Fasting glucose 120. 250 before her and today 222. Plans to plant a garden and walk more at the park.           Objective   Vital Signs:   /78   Pulse 70   Temp 97.3 °F (36.3 °C)   Wt 101 kg (222 lb)   SpO2 100%   BMI 38.71 kg/m²     Review of Systems   Physical Exam  Vitals reviewed.   Constitutional:       General: She is not in acute distress.     Appearance: Normal appearance. She is well-developed.   HENT:      Head: Normocephalic and atraumatic.   Eyes:      Conjunctiva/sclera: Conjunctivae normal.      Pupils: Pupils are equal, round, and reactive to light.   Cardiovascular:      Rate and Rhythm: Normal rate and regular rhythm.      Heart sounds: Normal heart sounds.   Pulmonary:      Effort: Pulmonary effort is normal.      Breath sounds: Normal breath sounds.   Musculoskeletal:      Cervical back: Neck supple.   Skin:     General: Skin is warm and dry.   Neurological:      Mental Status: She is alert and oriented to person, place, and time.   Psychiatric:         Mood and Affect: Mood and affect normal.         Behavior: Behavior normal.         Thought Content: Thought content normal.         Judgment: Judgment normal.        Result Review :   The following data was reviewed by: NEYMAR Bah on 03/02/2023:  Common labs    Common Labs 8/24/22 8/24/22 8/24/22 12/9/22 12/9/22 12/9/22 12/9/22 12/9/22    0927 0927 0927 1132 1132 1132 1132 1140   Glucose   235 (A)    112 (A)    BUN   12    8    Creatinine   0.63    0.65    Sodium   136    137    Potassium   4.5    4.2    Chloride   99    99    Calcium   9.2    9.2    Albumin   4.30    4.40    Total Bilirubin   0.3    0.3    Alkaline Phosphatase   80    86    AST  (SGOT)   21    19    ALT (SGPT)   23    35 (A)    WBC  8.06  8.65       Hemoglobin  13.6  13.2       Hematocrit  41.9  38.3       Platelets  317  272       Total Cholesterol     208 (A)      Triglycerides     123      HDL Cholesterol     40      LDL Cholesterol      146 (A)      Hemoglobin A1C 8.40 (A)     6.60 (A)     Microalbumin, Urine        <1.2   (A) Abnormal value            TSH    TSH 8/24/22   TSH 2.030                     Assessment and Plan    Diagnoses and all orders for this visit:    1. Type 2 diabetes mellitus with diabetic autonomic neuropathy, without long-term current use of insulin (HCC)  -     metFORMIN (GLUCOPHAGE) 500 MG tablet; Take 1 tablet by mouth 2 (Two) Times a Day With Meals.  Dispense: 180 tablet; Refill: 1  -     CBC & Differential; Future  -     Comprehensive Metabolic Panel; Future  -     Hemoglobin A1c; Future  -     Lipid Panel; Future    2. Anxiety  -     FLUoxetine (PROzac) 20 MG capsule; Take 1 capsule by mouth Daily.  Dispense: 90 capsule; Refill: 1  -     FLUoxetine (PROzac) 10 MG capsule; Take 1 capsule by mouth Daily.  Dispense: 90 capsule; Refill: 1    Continue to follow diet changes and regular exercise/increased activity.  Have fasting labs drawn.    Increase Prozac to 30 mg daily, patient to notify with continued difficulty with sleep and worsening of anxiety symptoms.    Follow Up   Return in about 6 months (around 9/2/2023) for Refills and fasting labs.  Patient was given instructions and counseling regarding her condition or for health maintenance advice. Please see specific information pulled into the AVS if appropriate.       Answers for HPI/ROS submitted by the patient on 3/2/2023  Please describe your symptoms.: Rx refills for metformin & prozac  Have you had these symptoms before?: Yes  How long have you been having these symptoms?: Greater than 2 weeks  What is the primary reason for your visit?: Other

## 2023-05-23 ENCOUNTER — PATIENT MESSAGE (OUTPATIENT)
Dept: FAMILY MEDICINE CLINIC | Facility: CLINIC | Age: 33
End: 2023-05-23
Payer: COMMERCIAL

## 2023-05-23 NOTE — TELEPHONE ENCOUNTER
From: Irlanda Maradiaga  To: Olga Whyte  Sent: 5/23/2023 10:21 AM EDT  Subject: Naya Espinoza! I am interested in trying out Wegovy for weight loss. Is that something you’d recommend for someone in my situation? I have lost some weight with the metformin / diet changes but I’d like to try wegovy. I still need to get my labs done that you ordered and plan on going this week! Just didn’t want you to think I forgot.

## 2023-08-11 ENCOUNTER — CLINICAL SUPPORT (OUTPATIENT)
Dept: FAMILY MEDICINE CLINIC | Facility: CLINIC | Age: 33
End: 2023-08-11
Payer: COMMERCIAL

## 2023-08-11 DIAGNOSIS — E11.43 TYPE 2 DIABETES MELLITUS WITH DIABETIC AUTONOMIC NEUROPATHY, WITHOUT LONG-TERM CURRENT USE OF INSULIN: ICD-10-CM

## 2023-08-11 LAB
ALBUMIN SERPL-MCNC: 4.1 G/DL (ref 3.5–5.2)
ALBUMIN/GLOB SERPL: 1.5 G/DL
ALP SERPL-CCNC: 78 U/L (ref 39–117)
ALT SERPL W P-5'-P-CCNC: 16 U/L (ref 1–33)
ANION GAP SERPL CALCULATED.3IONS-SCNC: 10.6 MMOL/L (ref 5–15)
AST SERPL-CCNC: 15 U/L (ref 1–32)
BASOPHILS # BLD AUTO: 0.06 10*3/MM3 (ref 0–0.2)
BASOPHILS NFR BLD AUTO: 0.7 % (ref 0–1.5)
BILIRUB SERPL-MCNC: 0.4 MG/DL (ref 0–1.2)
BUN SERPL-MCNC: 12 MG/DL (ref 6–20)
BUN/CREAT SERPL: 18.2 (ref 7–25)
CALCIUM SPEC-SCNC: 9 MG/DL (ref 8.6–10.5)
CHLORIDE SERPL-SCNC: 101 MMOL/L (ref 98–107)
CHOLEST SERPL-MCNC: 225 MG/DL (ref 0–200)
CO2 SERPL-SCNC: 25.4 MMOL/L (ref 22–29)
CREAT SERPL-MCNC: 0.66 MG/DL (ref 0.57–1)
DEPRECATED RDW RBC AUTO: 40.1 FL (ref 37–54)
EGFRCR SERPLBLD CKD-EPI 2021: 120.4 ML/MIN/1.73
EOSINOPHIL # BLD AUTO: 0.07 10*3/MM3 (ref 0–0.4)
EOSINOPHIL NFR BLD AUTO: 0.8 % (ref 0.3–6.2)
ERYTHROCYTE [DISTWIDTH] IN BLOOD BY AUTOMATED COUNT: 12.4 % (ref 12.3–15.4)
GLOBULIN UR ELPH-MCNC: 2.7 GM/DL
GLUCOSE SERPL-MCNC: 130 MG/DL (ref 65–99)
HBA1C MFR BLD: 6.7 % (ref 4.8–5.6)
HCT VFR BLD AUTO: 38 % (ref 34–46.6)
HDLC SERPL-MCNC: 44 MG/DL (ref 40–60)
HGB BLD-MCNC: 12.8 G/DL (ref 12–15.9)
IMM GRANULOCYTES # BLD AUTO: 0.05 10*3/MM3 (ref 0–0.05)
IMM GRANULOCYTES NFR BLD AUTO: 0.6 % (ref 0–0.5)
LDLC SERPL CALC-MCNC: 158 MG/DL (ref 0–100)
LDLC/HDLC SERPL: 3.53 {RATIO}
LYMPHOCYTES # BLD AUTO: 2.27 10*3/MM3 (ref 0.7–3.1)
LYMPHOCYTES NFR BLD AUTO: 25.3 % (ref 19.6–45.3)
MCH RBC QN AUTO: 30.4 PG (ref 26.6–33)
MCHC RBC AUTO-ENTMCNC: 33.7 G/DL (ref 31.5–35.7)
MCV RBC AUTO: 90.3 FL (ref 79–97)
MONOCYTES # BLD AUTO: 0.69 10*3/MM3 (ref 0.1–0.9)
MONOCYTES NFR BLD AUTO: 7.7 % (ref 5–12)
NEUTROPHILS NFR BLD AUTO: 5.84 10*3/MM3 (ref 1.7–7)
NEUTROPHILS NFR BLD AUTO: 64.9 % (ref 42.7–76)
NRBC BLD AUTO-RTO: 0 /100 WBC (ref 0–0.2)
PLATELET # BLD AUTO: 283 10*3/MM3 (ref 140–450)
PMV BLD AUTO: 10.9 FL (ref 6–12)
POTASSIUM SERPL-SCNC: 4.7 MMOL/L (ref 3.5–5.2)
PROT SERPL-MCNC: 6.8 G/DL (ref 6–8.5)
RBC # BLD AUTO: 4.21 10*6/MM3 (ref 3.77–5.28)
SODIUM SERPL-SCNC: 137 MMOL/L (ref 136–145)
TRIGL SERPL-MCNC: 129 MG/DL (ref 0–150)
VLDLC SERPL-MCNC: 23 MG/DL (ref 5–40)
WBC NRBC COR # BLD: 8.98 10*3/MM3 (ref 3.4–10.8)

## 2023-08-11 PROCEDURE — 85025 COMPLETE CBC W/AUTO DIFF WBC: CPT | Performed by: NURSE PRACTITIONER

## 2023-08-11 PROCEDURE — 80053 COMPREHEN METABOLIC PANEL: CPT | Performed by: NURSE PRACTITIONER

## 2023-08-11 PROCEDURE — 83036 HEMOGLOBIN GLYCOSYLATED A1C: CPT | Performed by: NURSE PRACTITIONER

## 2023-08-11 PROCEDURE — 36415 COLL VENOUS BLD VENIPUNCTURE: CPT | Performed by: NURSE PRACTITIONER

## 2023-08-11 PROCEDURE — 80061 LIPID PANEL: CPT | Performed by: NURSE PRACTITIONER

## 2023-08-11 NOTE — PROGRESS NOTES
Venipuncture Blood Specimen Collection  Venipuncture performed in left arm by Sandi Posada with good hemostasis. Patient tolerated the procedure well without complications.   08/11/23   Sandi Posada

## 2023-08-18 ENCOUNTER — OFFICE VISIT (OUTPATIENT)
Dept: FAMILY MEDICINE CLINIC | Facility: CLINIC | Age: 33
End: 2023-08-18
Payer: COMMERCIAL

## 2023-08-18 VITALS
BODY MASS INDEX: 38.58 KG/M2 | SYSTOLIC BLOOD PRESSURE: 120 MMHG | DIASTOLIC BLOOD PRESSURE: 82 MMHG | HEART RATE: 100 BPM | HEIGHT: 64 IN | OXYGEN SATURATION: 97 % | TEMPERATURE: 97.3 F | WEIGHT: 226 LBS

## 2023-08-18 DIAGNOSIS — F41.9 ANXIETY: ICD-10-CM

## 2023-08-18 DIAGNOSIS — E11.43 TYPE 2 DIABETES MELLITUS WITH DIABETIC AUTONOMIC NEUROPATHY, WITHOUT LONG-TERM CURRENT USE OF INSULIN: Primary | ICD-10-CM

## 2023-08-18 DIAGNOSIS — E66.01 CLASS 2 SEVERE OBESITY WITH SERIOUS COMORBIDITY AND BODY MASS INDEX (BMI) OF 39.0 TO 39.9 IN ADULT, UNSPECIFIED OBESITY TYPE: ICD-10-CM

## 2023-08-18 RX ORDER — TIRZEPATIDE 2.5 MG/.5ML
2.5 INJECTION, SOLUTION SUBCUTANEOUS
Qty: 2 ML | Refills: 1 | Status: SHIPPED | OUTPATIENT
Start: 2023-08-18

## 2023-08-18 RX ORDER — FLUOXETINE HYDROCHLORIDE 20 MG/1
20 CAPSULE ORAL DAILY
Qty: 90 CAPSULE | Refills: 1 | Status: SHIPPED | OUTPATIENT
Start: 2023-08-18

## 2023-08-18 RX ORDER — HYDROXYZINE HYDROCHLORIDE 10 MG/1
10 TABLET, FILM COATED ORAL 3 TIMES DAILY PRN
Qty: 270 TABLET | Refills: 1 | Status: SHIPPED | OUTPATIENT
Start: 2023-08-18

## 2023-08-18 RX ORDER — FLUOXETINE 10 MG/1
10 CAPSULE ORAL DAILY
Qty: 90 CAPSULE | Refills: 1 | Status: SHIPPED | OUTPATIENT
Start: 2023-08-18

## 2023-08-18 NOTE — PROGRESS NOTES
"Chief Complaint  Diabetes (Refills ) and Weight Check (Discuss medication for weight loss.)    Subjective        Past Medical History:   Diagnosis Date    Anxiety 08/2022    Gestational diabetes     Heart murmur Birth    Cleared up and released from cardiologist at 18 years old    Iron deficiency     Kidney stone 08/2016    Have had 3 kidney stones     Social History     Tobacco Use    Smoking status: Never     Passive exposure: Never    Smokeless tobacco: Never   Vaping Use    Vaping Use: Never used   Substance Use Topics    Alcohol use: Not Currently     Comment: Social maybe 2 drinks a month    Drug use: Never      Current Outpatient Medications on File Prior to Visit   Medication Sig    Blood Glucose Monitoring Suppl (Accu-Chek Nara Plus) w/Device kit Use as directed to check glucose once daily    glucose blood (Accu-Chek Nara Plus) test strip Use as instructed to monitor glucose once daily    Lancets (accu-chek multiclix) lancets Use as directed to monitor glucose once daily    metFORMIN (GLUCOPHAGE) 500 MG tablet Take 1 tablet by mouth 2 (Two) Times a Day With Meals.     No current facility-administered medications on file prior to visit.      No Known Allergies   Health Maintenance Due   Topic Date Due    Hepatitis B (1 of 3 - 3-dose series) Never done    Pneumococcal Vaccine 0-64 (1 - PCV) Never done    TDAP/TD VACCINES (1 - Tdap) Never done    COVID-19 Vaccine (3 - Moderna series) 10/17/2021    ANNUAL PHYSICAL  Never done    DIABETIC EYE EXAM  Never done      Irlanda Maradiaga presents to [facility]  History of Present Illness  Here to follow up on chronic conditions and obtain refills.     DM: no issues with metformin.     Pt notes she has been cutting calories and walks daily and notes she hasn't been able to lose weight. Pt asks about Wegovy.   Objective   Vital Signs:   /82 (BP Location: Left arm)   Pulse 100   Temp 97.3 øF (36.3 øC)   Ht 161.3 cm (63.5\")   " Wt 103 kg (226 lb)   SpO2 97%   BMI 39.41 kg/mý     Review of Systems   Physical Exam  Vitals reviewed.   Constitutional:       General: She is not in acute distress.     Appearance: Normal appearance. She is well-developed. She is obese.   HENT:      Head: Normocephalic and atraumatic.   Eyes:      Conjunctiva/sclera: Conjunctivae normal.      Pupils: Pupils are equal, round, and reactive to light.   Cardiovascular:      Rate and Rhythm: Normal rate and regular rhythm.      Heart sounds: Normal heart sounds.   Pulmonary:      Effort: Pulmonary effort is normal.      Breath sounds: Normal breath sounds.   Musculoskeletal:      Cervical back: Neck supple.      Right lower leg: No edema.      Left lower leg: No edema.   Skin:     General: Skin is warm and dry.   Neurological:      Mental Status: She is alert and oriented to person, place, and time.   Psychiatric:         Mood and Affect: Mood and affect normal.         Behavior: Behavior normal.         Thought Content: Thought content normal.         Judgment: Judgment normal.      Result Review :   The following data was reviewed by: NEYMAR Bah on 08/18/2023:  Common labs          12/9/2022    11:32 12/9/2022    11:40 8/11/2023    10:50   Common Labs   Glucose 112   130    BUN 8   12    Creatinine 0.65   0.66    Sodium 137   137    Potassium 4.2   4.7    Chloride 99   101    Calcium 9.2   9.0    Albumin 4.40   4.1    Total Bilirubin 0.3   0.4    Alkaline Phosphatase 86   78    AST (SGOT) 19   15    ALT (SGPT) 35   16    WBC 8.65   8.98    Hemoglobin 13.2   12.8    Hematocrit 38.3   38.0    Platelets 272   283    Total Cholesterol 208   225    Triglycerides 123   129    HDL Cholesterol 40   44    LDL Cholesterol  146   158    Hemoglobin A1C 6.60   6.70    Microalbumin, Urine  <1.2                     Assessment and Plan    Diagnoses and all orders for this visit:    1. Type 2 diabetes mellitus with diabetic autonomic neuropathy, without long-term  current use of insulin (Primary)  -     Tirzepatide (Mounjaro) 2.5 MG/0.5ML solution pen-injector; Inject 0.5 mL under the skin into the appropriate area as directed Every 7 (Seven) Days.  Dispense: 2 mL; Refill: 1    2. Class 2 severe obesity with serious comorbidity and body mass index (BMI) of 39.0 to 39.9 in adult, unspecified obesity type    3. Anxiety  -     FLUoxetine (PROzac) 10 MG capsule; Take 1 capsule by mouth Daily.  Dispense: 90 capsule; Refill: 1  -     FLUoxetine (PROzac) 20 MG capsule; Take 1 capsule by mouth Daily.  Dispense: 90 capsule; Refill: 1  -     hydrOXYzine (ATARAX) 10 MG tablet; Take 1 tablet by mouth 3 (Three) Times a Day As Needed for Anxiety.  Dispense: 270 tablet; Refill: 1      We will send in Mounjaro for patient to start due to diabetes and obesity.  Follow-up in 1 month.    Refill Prozac and hydroxyzine.    Class 2 Severe Obesity (BMI >=35 and <=39.9). Obesity-related health conditions include the following: diabetes mellitus. Obesity is improving with lifestyle modifications. BMI is is above average; BMI management plan is completed. We discussed portion control, increasing exercise, and pharmacologic options including Mounjaro .       Follow Up   Return in about 4 weeks (around 9/15/2023) for Recheck.  Patient was given instructions and counseling regarding her condition or for health maintenance advice. Please see specific information pulled into the AVS if appropriate.

## 2023-08-24 DIAGNOSIS — E11.43 TYPE 2 DIABETES MELLITUS WITH DIABETIC AUTONOMIC NEUROPATHY, WITHOUT LONG-TERM CURRENT USE OF INSULIN: ICD-10-CM

## 2023-09-15 ENCOUNTER — OFFICE VISIT (OUTPATIENT)
Dept: FAMILY MEDICINE CLINIC | Facility: CLINIC | Age: 33
End: 2023-09-15
Payer: COMMERCIAL

## 2023-09-15 VITALS
WEIGHT: 220 LBS | TEMPERATURE: 97.1 F | SYSTOLIC BLOOD PRESSURE: 122 MMHG | BODY MASS INDEX: 38.36 KG/M2 | OXYGEN SATURATION: 99 % | HEART RATE: 103 BPM | DIASTOLIC BLOOD PRESSURE: 82 MMHG

## 2023-09-15 DIAGNOSIS — E11.69 TYPE 2 DIABETES MELLITUS WITH OTHER SPECIFIED COMPLICATION, WITHOUT LONG-TERM CURRENT USE OF INSULIN: Primary | ICD-10-CM

## 2023-09-15 DIAGNOSIS — E66.01 CLASS 2 SEVERE OBESITY WITH SERIOUS COMORBIDITY AND BODY MASS INDEX (BMI) OF 39.0 TO 39.9 IN ADULT, UNSPECIFIED OBESITY TYPE: ICD-10-CM

## 2023-09-15 RX ORDER — TIRZEPATIDE 5 MG/.5ML
5 INJECTION, SOLUTION SUBCUTANEOUS
Qty: 6 ML | Refills: 0 | Status: SHIPPED | OUTPATIENT
Start: 2023-09-15

## 2023-09-15 NOTE — PROGRESS NOTES
Answers submitted by the patient for this visit:  Other (Submitted on 9/13/2023)  Please describe your symptoms.: No symptoms - follow up on mounjaro  Have you had these symptoms before?: No  How long have you been having these symptoms?: 1-4 days  Primary Reason for Visit (Submitted on 9/13/2023)  What is the primary reason for your visit?: Other  Chief Complaint  Diabetes (Follow up medication)    Subjective        Past Medical History:   Diagnosis Date    Anxiety 08/2022    Gestational diabetes     Heart murmur Birth    Cleared up and released from cardiologist at 18 years old    Iron deficiency     Kidney stone 08/2016    Have had 3 kidney stones     Social History     Tobacco Use    Smoking status: Never     Passive exposure: Never    Smokeless tobacco: Never   Vaping Use    Vaping Use: Never used   Substance Use Topics    Alcohol use: Not Currently     Comment: Social maybe 2 drinks a month    Drug use: Never      Current Outpatient Medications on File Prior to Visit   Medication Sig    Blood Glucose Monitoring Suppl (Accu-Chek Nara Plus) w/Device kit Use as directed to check glucose once daily    FLUoxetine (PROzac) 10 MG capsule Take 1 capsule by mouth Daily.    FLUoxetine (PROzac) 20 MG capsule Take 1 capsule by mouth Daily.    glucose blood (Accu-Chek Nara Plus) test strip Use as instructed to monitor glucose once daily    hydrOXYzine (ATARAX) 10 MG tablet Take 1 tablet by mouth 3 (Three) Times a Day As Needed for Anxiety.    Lancets (accu-chek multiclix) lancets Use as directed to monitor glucose once daily    [DISCONTINUED] Tirzepatide (Mounjaro) 2.5 MG/0.5ML solution pen-injector Inject 0.5 mL under the skin into the appropriate area as directed Every 7 (Seven) Days.    [DISCONTINUED] metFORMIN (GLUCOPHAGE) 500 MG tablet TAKE 1 TABLET BY MOUTH TWICE DAILY WITH MEALS (Patient not taking: Reported on 9/15/2023)     No current facility-administered medications on file prior to visit.      No Known  Allergies   Health Maintenance Due   Topic Date Due    Hepatitis B (1 of 3 - 3-dose series) Never done    Pneumococcal Vaccine 0-64 (1 - PCV) Never done    TDAP/TD VACCINES (1 - Tdap) Never done    COVID-19 Vaccine (3 - Moderna series) 10/17/2021    ANNUAL PHYSICAL  Never done    DIABETIC EYE EXAM  Never done      Irlanda Maradiaga presents to [facility]  History of Present Illness  Here to follow up on Diabetes and new start of Mounjaro, pt notes that the first 2 weeks she did really well but the last 1-2 weeks feels more hungry and wants to munch on foods. Pt has completely changed diets, drinking plenty of water and electrolytes. Notes mild constipation so eating a couple dried pitted dates daily and stool softener and corrected issues. Would like to increase dose.   Objective   Vital Signs:   /82   Pulse 103   Temp 97.1 °F (36.2 °C)   Wt 99.8 kg (220 lb)   SpO2 99%   BMI 38.36 kg/m²     Review of Systems   Physical Exam  Vitals reviewed.   Constitutional:       General: She is not in acute distress.     Appearance: Normal appearance. She is well-developed.   HENT:      Head: Normocephalic and atraumatic.   Eyes:      Conjunctiva/sclera: Conjunctivae normal.      Pupils: Pupils are equal, round, and reactive to light.   Cardiovascular:      Rate and Rhythm: Normal rate and regular rhythm.      Heart sounds: Normal heart sounds.   Pulmonary:      Effort: Pulmonary effort is normal.      Breath sounds: Normal breath sounds.   Musculoskeletal:      Cervical back: Neck supple.      Right lower leg: No edema.      Left lower leg: No edema.   Skin:     General: Skin is warm and dry.   Neurological:      Mental Status: She is alert and oriented to person, place, and time.   Psychiatric:         Mood and Affect: Mood and affect normal.         Behavior: Behavior normal.         Thought Content: Thought content normal.         Judgment: Judgment normal.      Result Review :    The following data was reviewed by: NEYMAR Bah on 09/15/2023:  Common labs          12/9/2022    11:32 12/9/2022    11:40 8/11/2023    10:50   Common Labs   Glucose 112   130    BUN 8   12    Creatinine 0.65   0.66    Sodium 137   137    Potassium 4.2   4.7    Chloride 99   101    Calcium 9.2   9.0    Albumin 4.40   4.1    Total Bilirubin 0.3   0.4    Alkaline Phosphatase 86   78    AST (SGOT) 19   15    ALT (SGPT) 35   16    WBC 8.65   8.98    Hemoglobin 13.2   12.8    Hematocrit 38.3   38.0    Platelets 272   283    Total Cholesterol 208   225    Triglycerides 123   129    HDL Cholesterol 40   44    LDL Cholesterol  146   158    Hemoglobin A1C 6.60   6.70    Microalbumin, Urine  <1.2                   Assessment and Plan    Diagnoses and all orders for this visit:    1. Type 2 diabetes mellitus with other specified complication, without long-term current use of insulin (Primary)  -     Tirzepatide (Mounjaro) 5 MG/0.5ML solution pen-injector; Inject 0.5 mL under the skin into the appropriate area as directed Every 7 (Seven) Days.  Dispense: 6 mL; Refill: 0    2. Class 2 severe obesity with serious comorbidity and body mass index (BMI) of 39.0 to 39.9 in adult, unspecified obesity type  -     Tirzepatide (Mounjaro) 5 MG/0.5ML solution pen-injector; Inject 0.5 mL under the skin into the appropriate area as directed Every 7 (Seven) Days.  Dispense: 6 mL; Refill: 0        Increased dose of Mounjaro.  Follow-up in 3 months.          Follow Up   Return in about 3 months (around 12/15/2023) for Refills and fasting labs.  Patient was given instructions and counseling regarding her condition or for health maintenance advice. Please see specific information pulled into the AVS if appropriate.

## 2023-10-23 ENCOUNTER — TELEPHONE (OUTPATIENT)
Dept: FAMILY MEDICINE CLINIC | Facility: CLINIC | Age: 33
End: 2023-10-23
Payer: COMMERCIAL

## 2023-10-23 DIAGNOSIS — E66.01 CLASS 2 SEVERE OBESITY WITH SERIOUS COMORBIDITY AND BODY MASS INDEX (BMI) OF 39.0 TO 39.9 IN ADULT, UNSPECIFIED OBESITY TYPE: ICD-10-CM

## 2023-10-23 DIAGNOSIS — E11.69 TYPE 2 DIABETES MELLITUS WITH OTHER SPECIFIED COMPLICATION, WITHOUT LONG-TERM CURRENT USE OF INSULIN: ICD-10-CM

## 2023-10-23 RX ORDER — TIRZEPATIDE 5 MG/.5ML
5 INJECTION, SOLUTION SUBCUTANEOUS
Qty: 6 ML | Refills: 0 | Status: CANCELLED | OUTPATIENT
Start: 2023-10-23

## 2023-10-23 RX ORDER — TIRZEPATIDE 7.5 MG/.5ML
7.5 INJECTION, SOLUTION SUBCUTANEOUS
Qty: 6 ML | Refills: 0 | Status: SHIPPED | OUTPATIENT
Start: 2023-10-23

## 2023-10-23 NOTE — TELEPHONE ENCOUNTER
Caller: Irlanda Maradiaga DINAH    Relationship: Self    Best call back number: [phone]     Requested Prescriptions:   Requested Prescriptions     Pending Prescriptions Disp Refills    Tirzepatide (Mounjaro) 5 MG/0.5ML solution pen-injector 6 mL 0     Sig: Inject 0.5 mL under the skin into the appropriate area as directed Every 7 (Seven) Days.        Pharmacy where request should be sent: [id number] - [place], [place] - [address] AT [address] - [phone]  - [phone] FX     Last office visit with prescribing clinician: 9/15/2023   Last telemedicine visit with prescribing clinician: Visit date not found   Next office visit with prescribing clinician: 12/15/2023     Additional details provided by patient: PATIENT IS REQUESTING AN INCREASE IN DOSAGE TO 7.5, DOES NOT FEEL LIKE THE SUPPRESSION IS THERE, PATIENT WOULD ALSO LIKE A 90 DAYS SUPPLY PRESCRIBED.     Does the patient have less than a 3 day supply:  [] Yes  [x] No    Would you like a call back once the refill request has been completed: [] Yes [x] No    If the office needs to give you a call back, can they leave a voicemail: [] Yes [x] No    Mindi Pitts Rep   10/23/23 15:45 EDT       .”

## 2023-12-08 ENCOUNTER — OFFICE VISIT (OUTPATIENT)
Dept: FAMILY MEDICINE CLINIC | Facility: CLINIC | Age: 33
End: 2023-12-08
Payer: COMMERCIAL

## 2023-12-08 VITALS
DIASTOLIC BLOOD PRESSURE: 72 MMHG | SYSTOLIC BLOOD PRESSURE: 112 MMHG | TEMPERATURE: 97.3 F | HEART RATE: 112 BPM | WEIGHT: 209 LBS | BODY MASS INDEX: 35.68 KG/M2 | HEIGHT: 64 IN | OXYGEN SATURATION: 98 %

## 2023-12-08 DIAGNOSIS — J30.89 NON-SEASONAL ALLERGIC RHINITIS, UNSPECIFIED TRIGGER: ICD-10-CM

## 2023-12-08 DIAGNOSIS — F41.9 ANXIETY: ICD-10-CM

## 2023-12-08 DIAGNOSIS — E11.69 TYPE 2 DIABETES MELLITUS WITH OTHER SPECIFIED COMPLICATION, WITHOUT LONG-TERM CURRENT USE OF INSULIN: Primary | ICD-10-CM

## 2023-12-08 DIAGNOSIS — E11.9 ENCOUNTER FOR DIABETIC FOOT EXAM: ICD-10-CM

## 2023-12-08 LAB
ALBUMIN SERPL-MCNC: 4.4 G/DL (ref 3.5–5.2)
ALBUMIN/GLOB SERPL: 1.5 G/DL
ALP SERPL-CCNC: 75 U/L (ref 39–117)
ALT SERPL W P-5'-P-CCNC: 15 U/L (ref 1–33)
ANION GAP SERPL CALCULATED.3IONS-SCNC: 10.2 MMOL/L (ref 5–15)
AST SERPL-CCNC: 10 U/L (ref 1–32)
BACTERIA UR QL AUTO: ABNORMAL /HPF
BASOPHILS # BLD AUTO: 0.03 10*3/MM3 (ref 0–0.2)
BASOPHILS NFR BLD AUTO: 0.5 % (ref 0–1.5)
BILIRUB SERPL-MCNC: 0.3 MG/DL (ref 0–1.2)
BILIRUB UR QL STRIP: NEGATIVE
BUN SERPL-MCNC: 11 MG/DL (ref 6–20)
BUN/CREAT SERPL: 15.9 (ref 7–25)
CALCIUM SPEC-SCNC: 9 MG/DL (ref 8.6–10.5)
CHLORIDE SERPL-SCNC: 105 MMOL/L (ref 98–107)
CHOLEST SERPL-MCNC: 199 MG/DL (ref 0–200)
CLARITY UR: ABNORMAL
CO2 SERPL-SCNC: 23.8 MMOL/L (ref 22–29)
COLOR UR: YELLOW
CREAT SERPL-MCNC: 0.69 MG/DL (ref 0.57–1)
DEPRECATED RDW RBC AUTO: 40.5 FL (ref 37–54)
EGFRCR SERPLBLD CKD-EPI 2021: 118.4 ML/MIN/1.73
EOSINOPHIL # BLD AUTO: 0.02 10*3/MM3 (ref 0–0.4)
EOSINOPHIL NFR BLD AUTO: 0.4 % (ref 0.3–6.2)
ERYTHROCYTE [DISTWIDTH] IN BLOOD BY AUTOMATED COUNT: 12.4 % (ref 12.3–15.4)
GLOBULIN UR ELPH-MCNC: 2.9 GM/DL
GLUCOSE SERPL-MCNC: 102 MG/DL (ref 65–99)
GLUCOSE UR STRIP-MCNC: NEGATIVE MG/DL
HBA1C MFR BLD: 5.6 % (ref 4.8–5.6)
HCT VFR BLD AUTO: 39.3 % (ref 34–46.6)
HDLC SERPL-MCNC: 36 MG/DL (ref 40–60)
HGB BLD-MCNC: 13.3 G/DL (ref 12–15.9)
HGB UR QL STRIP.AUTO: NEGATIVE
HOLD SPECIMEN: NORMAL
HYALINE CASTS UR QL AUTO: ABNORMAL /LPF
IMM GRANULOCYTES # BLD AUTO: 0.02 10*3/MM3 (ref 0–0.05)
IMM GRANULOCYTES NFR BLD AUTO: 0.4 % (ref 0–0.5)
KETONES UR QL STRIP: ABNORMAL
LDLC SERPL CALC-MCNC: 145 MG/DL (ref 0–100)
LDLC/HDLC SERPL: 3.99 {RATIO}
LEUKOCYTE ESTERASE UR QL STRIP.AUTO: ABNORMAL
LYMPHOCYTES # BLD AUTO: 1.67 10*3/MM3 (ref 0.7–3.1)
LYMPHOCYTES NFR BLD AUTO: 29.5 % (ref 19.6–45.3)
MCH RBC QN AUTO: 30.6 PG (ref 26.6–33)
MCHC RBC AUTO-ENTMCNC: 33.8 G/DL (ref 31.5–35.7)
MCV RBC AUTO: 90.3 FL (ref 79–97)
MONOCYTES # BLD AUTO: 0.39 10*3/MM3 (ref 0.1–0.9)
MONOCYTES NFR BLD AUTO: 6.9 % (ref 5–12)
MUCOUS THREADS URNS QL MICRO: ABNORMAL /HPF
NEUTROPHILS NFR BLD AUTO: 3.53 10*3/MM3 (ref 1.7–7)
NEUTROPHILS NFR BLD AUTO: 62.3 % (ref 42.7–76)
NITRITE UR QL STRIP: NEGATIVE
NRBC BLD AUTO-RTO: 0 /100 WBC (ref 0–0.2)
PH UR STRIP.AUTO: 7 [PH] (ref 5–8)
PLATELET # BLD AUTO: 273 10*3/MM3 (ref 140–450)
PMV BLD AUTO: 11.3 FL (ref 6–12)
POTASSIUM SERPL-SCNC: 4.3 MMOL/L (ref 3.5–5.2)
PROT SERPL-MCNC: 7.3 G/DL (ref 6–8.5)
PROT UR QL STRIP: ABNORMAL
RBC # BLD AUTO: 4.35 10*6/MM3 (ref 3.77–5.28)
RBC # UR STRIP: ABNORMAL /HPF
REF LAB TEST METHOD: ABNORMAL
SODIUM SERPL-SCNC: 139 MMOL/L (ref 136–145)
SP GR UR STRIP: 1.03 (ref 1–1.03)
SQUAMOUS #/AREA URNS HPF: ABNORMAL /HPF
TRIGL SERPL-MCNC: 96 MG/DL (ref 0–150)
UROBILINOGEN UR QL STRIP: ABNORMAL
VLDLC SERPL-MCNC: 18 MG/DL (ref 5–40)
WBC # UR STRIP: ABNORMAL /HPF
WBC NRBC COR # BLD AUTO: 5.66 10*3/MM3 (ref 3.4–10.8)

## 2023-12-08 PROCEDURE — 80061 LIPID PANEL: CPT | Performed by: NURSE PRACTITIONER

## 2023-12-08 PROCEDURE — 87088 URINE BACTERIA CULTURE: CPT | Performed by: NURSE PRACTITIONER

## 2023-12-08 PROCEDURE — 85025 COMPLETE CBC W/AUTO DIFF WBC: CPT | Performed by: NURSE PRACTITIONER

## 2023-12-08 PROCEDURE — 83036 HEMOGLOBIN GLYCOSYLATED A1C: CPT | Performed by: NURSE PRACTITIONER

## 2023-12-08 PROCEDURE — 81001 URINALYSIS AUTO W/SCOPE: CPT | Performed by: NURSE PRACTITIONER

## 2023-12-08 PROCEDURE — 80053 COMPREHEN METABOLIC PANEL: CPT | Performed by: NURSE PRACTITIONER

## 2023-12-08 PROCEDURE — 87186 SC STD MICRODIL/AGAR DIL: CPT | Performed by: NURSE PRACTITIONER

## 2023-12-08 PROCEDURE — 87086 URINE CULTURE/COLONY COUNT: CPT | Performed by: NURSE PRACTITIONER

## 2023-12-08 RX ORDER — LEVOCETIRIZINE DIHYDROCHLORIDE 5 MG/1
5 TABLET, FILM COATED ORAL EVERY EVENING
Qty: 90 TABLET | Refills: 1 | Status: SHIPPED | OUTPATIENT
Start: 2023-12-08

## 2023-12-08 RX ORDER — FLUOXETINE HYDROCHLORIDE 20 MG/1
20 CAPSULE ORAL DAILY
Qty: 90 CAPSULE | Refills: 1 | Status: SHIPPED | OUTPATIENT
Start: 2023-12-08

## 2023-12-08 RX ORDER — FLUOXETINE 10 MG/1
10 CAPSULE ORAL DAILY
Qty: 90 CAPSULE | Refills: 1 | Status: SHIPPED | OUTPATIENT
Start: 2023-12-08

## 2023-12-08 RX ORDER — HYDROXYZINE HYDROCHLORIDE 10 MG/1
10 TABLET, FILM COATED ORAL 3 TIMES DAILY PRN
Qty: 270 TABLET | Refills: 1 | Status: SHIPPED | OUTPATIENT
Start: 2023-12-08

## 2023-12-08 NOTE — PROGRESS NOTES
Chief Complaint  Anxiety (Refills and labs, patient is fasting) and Allergies (Wants to start allergy medication)    Subjective        Past Medical History:   Diagnosis Date    Anxiety 08/2022    Gestational diabetes     Heart murmur Birth    Cleared up and released from cardiologist at 18 years old    Iron deficiency     Kidney stone 08/2016    Have had 3 kidney stones     Social History     Tobacco Use    Smoking status: Never     Passive exposure: Never    Smokeless tobacco: Never   Vaping Use    Vaping Use: Never used   Substance Use Topics    Alcohol use: Not Currently     Comment: Social maybe 2 drinks a month    Drug use: Never      Current Outpatient Medications on File Prior to Visit   Medication Sig    Blood Glucose Monitoring Suppl (Accu-Chek Nara Plus) w/Device kit Use as directed to check glucose once daily    glucose blood (Accu-Chek Nara Plus) test strip Use as instructed to monitor glucose once daily    Lancets (accu-chek multiclix) lancets Use as directed to monitor glucose once daily    [DISCONTINUED] FLUoxetine (PROzac) 10 MG capsule Take 1 capsule by mouth Daily.    [DISCONTINUED] FLUoxetine (PROzac) 20 MG capsule Take 1 capsule by mouth Daily.    [DISCONTINUED] hydrOXYzine (ATARAX) 10 MG tablet Take 1 tablet by mouth 3 (Three) Times a Day As Needed for Anxiety.    [DISCONTINUED] Tirzepatide (Mounjaro) 7.5 MG/0.5ML solution pen-injector Inject 0.5 mL under the skin into the appropriate area as directed Every 7 (Seven) Days.     No current facility-administered medications on file prior to visit.      No Known Allergies   Health Maintenance Due   Topic Date Due    Hepatitis B (1 of 3 - 3-dose series) Never done    ANNUAL PHYSICAL  Never done    DIABETIC EYE EXAM  Never done    COVID-19 Vaccine (3 - 2023-24 season) 09/01/2023      Irlanda Maradiaga presents to [facility]  History of Present Illness  Patient presents to the office today to follow-up on chronic  "health conditions and obtain refills of medications.  Patient is fasting for labs.    DM: pt notes she has been on Mounjaro 7.5mg and notes she is doing well with current dose without any issues. Her weight is down 17lbs over the past 4 months. She has had increased weight loss with current dose of injection. Recommended eye exam; no recent exam. Denies burning or tingling of the feet.     Anxiety:symptoms are well controlled but notes some increased stress around the holidays, no significant increase in symptoms other than one day she woke up with a panic feeling and took hydroxyzine.     AR: has been using Clarispray but states it isn't helping to control her morning congestion and runny nose. Pt states she will gag in the morning due to the drainage.     Pt does note some night sweats and clammy hands.     Objective   Vital Signs:   /72 (BP Location: Left arm)   Pulse 112   Temp 97.3 °F (36.3 °C)   Ht 161.3 cm (63.5\")   Wt 94.8 kg (209 lb)   SpO2 98%   BMI 36.44 kg/m²     Review of Systems   Physical Exam  Vitals reviewed.   Constitutional:       General: She is not in acute distress.     Appearance: Normal appearance. She is well-developed.   HENT:      Head: Normocephalic and atraumatic.   Eyes:      Conjunctiva/sclera: Conjunctivae normal.      Pupils: Pupils are equal, round, and reactive to light.   Cardiovascular:      Rate and Rhythm: Normal rate and regular rhythm.      Pulses:           Dorsalis pedis pulses are 2+ on the right side and 2+ on the left side.      Heart sounds: Normal heart sounds.   Pulmonary:      Effort: Pulmonary effort is normal.      Breath sounds: Normal breath sounds.   Musculoskeletal:      Cervical back: Neck supple.      Right lower leg: No edema.      Left lower leg: No edema.      Right foot: No bunion.      Left foot: No bunion.   Feet:      Right foot:      Protective Sensation: 9 sites tested.  9 sites sensed.      Skin integrity: Skin integrity normal. No " ulcer, blister or callus.      Toenail Condition: Right toenails are normal.      Left foot:      Protective Sensation: 9 sites tested.  9 sites sensed.      Skin integrity: Skin integrity normal. No ulcer, blister or callus.      Toenail Condition: Left toenails are normal.      Comments:      Skin:     General: Skin is warm and dry.   Neurological:      Mental Status: She is alert and oriented to person, place, and time.   Psychiatric:         Mood and Affect: Mood and affect normal.         Behavior: Behavior normal.         Thought Content: Thought content normal.         Judgment: Judgment normal.        Result Review :   The following data was reviewed by: NEYMAR Bah on 12/08/2023:  Common labs          8/11/2023    10:50   Common Labs   Glucose 130    BUN 12    Creatinine 0.66    Sodium 137    Potassium 4.7    Chloride 101    Calcium 9.0    Albumin 4.1    Total Bilirubin 0.4    Alkaline Phosphatase 78    AST (SGOT) 15    ALT (SGPT) 16    WBC 8.98    Hemoglobin 12.8    Hematocrit 38.0    Platelets 283    Total Cholesterol 225    Triglycerides 129    HDL Cholesterol 44    LDL Cholesterol  158    Hemoglobin A1C 6.70                  Assessment and Plan    Diagnoses and all orders for this visit:    1. Type 2 diabetes mellitus with other specified complication, without long-term current use of insulin (Primary)  -     Tirzepatide (Mounjaro) 7.5 MG/0.5ML solution pen-injector; Inject 0.5 mL under the skin into the appropriate area as directed Every 7 (Seven) Days.  Dispense: 6 mL; Refill: 1  -     CBC & Differential  -     Comprehensive Metabolic Panel  -     Hemoglobin A1c  -     Urinalysis With Culture If Indicated - Urine, Clean Catch  -     Lipid Panel    2. Anxiety  -     FLUoxetine (PROzac) 10 MG capsule; Take 1 capsule by mouth Daily.  Dispense: 90 capsule; Refill: 1  -     FLUoxetine (PROzac) 20 MG capsule; Take 1 capsule by mouth Daily.  Dispense: 90 capsule; Refill: 1  -     hydrOXYzine  (ATARAX) 10 MG tablet; Take 1 tablet by mouth 3 (Three) Times a Day As Needed for Anxiety.  Dispense: 270 tablet; Refill: 1    3. Non-seasonal allergic rhinitis, unspecified trigger  -     levocetirizine (XYZAL) 5 MG tablet; Take 1 tablet by mouth Every Evening.  Dispense: 90 tablet; Refill: 1    4. Encounter for diabetic foot exam      Refill current medications.  Will start patient on Xyzal nightly for allergic rhinitis in the morning congestion.     Follow Up   Return in about 6 months (around 6/8/2024) for Refills and fasting labs.  Patient was given instructions and counseling regarding her condition or for health maintenance advice. Please see specific information pulled into the AVS if appropriate.       Answers submitted by the patient for this visit:  Primary Reason for Visit (Submitted on 12/8/2023)  What is the primary reason for your visit?: Physical

## 2023-12-08 NOTE — PROGRESS NOTES
Venipuncture Blood Specimen Collection  Venipuncture performed in left arm by erica gates with good hemostasis. Patient tolerated the procedure well without complications.   12/08/23   Sandi Posada

## 2023-12-10 LAB — BACTERIA SPEC AEROBE CULT: ABNORMAL

## 2023-12-15 ENCOUNTER — PATIENT MESSAGE (OUTPATIENT)
Dept: FAMILY MEDICINE CLINIC | Facility: CLINIC | Age: 33
End: 2023-12-15

## 2023-12-15 DIAGNOSIS — R10.9 FLANK PAIN: Primary | ICD-10-CM

## 2023-12-15 RX ORDER — TAMSULOSIN HYDROCHLORIDE 0.4 MG/1
1 CAPSULE ORAL DAILY
Qty: 10 CAPSULE | Refills: 0 | Status: SHIPPED | OUTPATIENT
Start: 2023-12-15

## 2023-12-15 NOTE — TELEPHONE ENCOUNTER
From: Irlanda Maradiaga  To: Olga Isabell  Sent: 12/15/2023 1:35 PM EST  Subject: Kidney stone    Hey Olga! I have noticed a dull pain in my side for the last couple days and it feels just like a kidney stone. Is there any way you could call in a RX for flomax for me? I don’t have time to come in office with it being the holidays and busy season at work!

## 2023-12-26 DIAGNOSIS — E11.43 TYPE 2 DIABETES MELLITUS WITH DIABETIC AUTONOMIC NEUROPATHY, WITHOUT LONG-TERM CURRENT USE OF INSULIN: ICD-10-CM

## 2024-03-06 ENCOUNTER — PATIENT MESSAGE (OUTPATIENT)
Dept: FAMILY MEDICINE CLINIC | Facility: CLINIC | Age: 34
End: 2024-03-06
Payer: COMMERCIAL

## 2024-03-06 DIAGNOSIS — E11.69 TYPE 2 DIABETES MELLITUS WITH OTHER SPECIFIED COMPLICATION, WITHOUT LONG-TERM CURRENT USE OF INSULIN: ICD-10-CM

## 2024-03-08 NOTE — TELEPHONE ENCOUNTER
From: Irlanda Maradiaga  To: Olga Whyte  Sent: 3/6/2024 11:59 AM EST  Subject: Suzy Person Olga! I have been noticing that the food noise has come back this week full force and I would like to be moved up to the 10mg mounjaro. Could you submit the rx for me- I like to get the 3 month supply to save money on my prescriptions if that’s possible. Thank you!!

## 2024-03-13 ENCOUNTER — PATIENT MESSAGE (OUTPATIENT)
Dept: FAMILY MEDICINE CLINIC | Facility: CLINIC | Age: 34
End: 2024-03-13
Payer: COMMERCIAL

## 2024-03-14 ENCOUNTER — OFFICE VISIT (OUTPATIENT)
Dept: FAMILY MEDICINE CLINIC | Facility: CLINIC | Age: 34
End: 2024-03-14
Payer: COMMERCIAL

## 2024-03-14 VITALS
SYSTOLIC BLOOD PRESSURE: 132 MMHG | OXYGEN SATURATION: 98 % | BODY MASS INDEX: 34.28 KG/M2 | HEART RATE: 105 BPM | WEIGHT: 200.78 LBS | HEIGHT: 64 IN | TEMPERATURE: 97.7 F | DIASTOLIC BLOOD PRESSURE: 78 MMHG

## 2024-03-14 DIAGNOSIS — J30.89 ALLERGIC RHINITIS DUE TO OTHER ALLERGIC TRIGGER, UNSPECIFIED SEASONALITY: ICD-10-CM

## 2024-03-14 DIAGNOSIS — J20.9 ACUTE BRONCHITIS, UNSPECIFIED ORGANISM: Primary | ICD-10-CM

## 2024-03-14 PROCEDURE — 99213 OFFICE O/P EST LOW 20 MIN: CPT | Performed by: NURSE PRACTITIONER

## 2024-03-14 RX ORDER — CETIRIZINE HYDROCHLORIDE 10 MG/1
10 TABLET ORAL DAILY
Qty: 90 TABLET | Refills: 1 | Status: SHIPPED | OUTPATIENT
Start: 2024-03-14

## 2024-03-14 RX ORDER — DEXAMETHASONE 4 MG/1
4 TABLET ORAL
Qty: 5 TABLET | Refills: 0 | Status: SHIPPED | OUTPATIENT
Start: 2024-03-14

## 2024-03-14 RX ORDER — AZITHROMYCIN 250 MG/1
TABLET, FILM COATED ORAL
Qty: 6 TABLET | Refills: 0 | Status: SHIPPED | OUTPATIENT
Start: 2024-03-14

## 2024-03-14 NOTE — PROGRESS NOTES
Chief Complaint  Cough    PHQ-2 Total Score: 0    Subjective        Past Medical History:   Diagnosis Date    Anxiety 08/2022    Gestational diabetes     Heart murmur Birth    Cleared up and released from cardiologist at 18 years old    Iron deficiency     Kidney stone 08/2016    Have had 3 kidney stones     Social History     Tobacco Use    Smoking status: Never     Passive exposure: Never    Smokeless tobacco: Never   Vaping Use    Vaping status: Never Used   Substance Use Topics    Alcohol use: Not Currently     Comment: Social maybe 2 drinks a month    Drug use: Never      Current Outpatient Medications on File Prior to Visit   Medication Sig    Blood Glucose Monitoring Suppl (Accu-Chek Nara Plus) w/Device kit Use as directed to check glucose once daily    FLUoxetine (PROzac) 10 MG capsule Take 1 capsule by mouth Daily.    FLUoxetine (PROzac) 20 MG capsule Take 1 capsule by mouth Daily.    glucose blood (Accu-Chek Nara Plus) test strip Use as instructed to monitor glucose once daily    hydrOXYzine (ATARAX) 10 MG tablet Take 1 tablet by mouth 3 (Three) Times a Day As Needed for Anxiety.    Lancets (accu-chek multiclix) lancets Use as directed to monitor glucose once daily    Tirzepatide (MOUNJARO) 10 MG/0.5ML solution pen-injector pen Inject 0.5 mL under the skin into the appropriate area as directed 1 (One) Time Per Week.    [DISCONTINUED] levocetirizine (XYZAL) 5 MG tablet Take 1 tablet by mouth Every Evening.    [DISCONTINUED] tamsulosin (FLOMAX) 0.4 MG capsule 24 hr capsule Take 1 capsule by mouth Daily.     No current facility-administered medications on file prior to visit.      No Known Allergies   Health Maintenance Due   Topic Date Due    Hepatitis B (1 of 3 - 19+ 3-dose series) Never done    ANNUAL PHYSICAL  Never done    DIABETIC EYE EXAM  Never done    COVID-19 Vaccine (3 - 2023-24 season) 09/01/2023    URINE MICROALBUMIN  12/09/2023      Irlanda Maradiaga presents to [facility]  "FAMILY MEDICINE  Cough  This is a recurrent problem. The current episode started 1 to 4 weeks ago. The problem has been coming and going. The problem occurs intermittent. The cough is Productive of clear sputum. Associated symptoms include nasal congestion, rhinorrhea and wheezing. Pertinent negatives include no chest pain, chills, ear congestion, ear pain, fever, headaches, heartburn, hemoptysis, myalgias, postnasal drip, rash, sore throat, sweats or weight loss. The symptoms are aggravated by lying down.     Here with intermittent cough. Also notes drainage. Nighttime coughing episodes. Denies nausea, vomiting, or diarrhea. Notes she had a cough in Dec into Jan then resolved then returned in Feb. Pts mother in law and therapist have recently had bronchitis. Notes taking Xyzal and continues to wake up with sinus drainage.     She is down 26lbs over the past 6 months. Decreased nightsweats.     Objective   Vital Signs:   /78 (BP Location: Left arm, Patient Position: Sitting, Cuff Size: Adult)   Pulse 105   Temp 97.7 °F (36.5 °C) (Temporal)   Ht 161.3 cm (63.5\")   Wt 91.1 kg (200 lb 12.5 oz)   SpO2 98%   BMI 35.01 kg/m²     Review of Systems   Constitutional:  Negative for chills, fever and unexpected weight loss.   HENT:  Positive for rhinorrhea. Negative for ear pain, postnasal drip and sore throat.    Respiratory:  Positive for cough and wheezing. Negative for hemoptysis.    Cardiovascular:  Negative for chest pain.   Musculoskeletal:  Negative for myalgias.   Skin:  Negative for rash.      Physical Exam  Vitals reviewed.   Constitutional:       General: She is not in acute distress.     Appearance: Normal appearance. She is well-developed.   HENT:      Head: Normocephalic and atraumatic.      Right Ear: Tympanic membrane, ear canal and external ear normal.      Left Ear: Tympanic membrane, ear canal and external ear normal.      Mouth/Throat:      Pharynx: Oropharyngeal exudate present.   Eyes:      " Conjunctiva/sclera: Conjunctivae normal.      Pupils: Pupils are equal, round, and reactive to light.   Cardiovascular:      Rate and Rhythm: Normal rate and regular rhythm.      Heart sounds: Normal heart sounds.   Pulmonary:      Effort: Pulmonary effort is normal.      Breath sounds: Normal breath sounds.   Musculoskeletal:      Cervical back: Neck supple.      Right lower leg: No edema.      Left lower leg: No edema.   Skin:     General: Skin is warm and dry.   Neurological:      Mental Status: She is alert and oriented to person, place, and time.   Psychiatric:         Mood and Affect: Mood and affect normal.         Behavior: Behavior normal.         Thought Content: Thought content normal.         Judgment: Judgment normal.        Result Review :                 Assessment and Plan    Diagnoses and all orders for this visit:    1. Acute bronchitis, unspecified organism (Primary)  -     azithromycin (Zithromax Z-Kaushik) 250 MG tablet; Take 2 tablets by mouth on day 1, then 1 tablet daily on days 2-5  Dispense: 6 tablet; Refill: 0  -     dexAMETHasone (DECADRON) 4 MG tablet; Take 1 tablet by mouth Daily With Breakfast.  Dispense: 5 tablet; Refill: 0    2. Allergic rhinitis due to other allergic trigger, unspecified seasonality  -     cetirizine (zyrTEC) 10 MG tablet; Take 1 tablet by mouth Daily.  Dispense: 90 tablet; Refill: 1        Based on symptoms and recurrence of cough will treat as acute bronchitis.  Discussed with patient could also be due to allergy drainage.  Patient states understanding and is agreeable to treatment plan. No improvement in 1 week and will order Chest x-ray for patient to have done at a [facility] imaging facility in [facility].           Follow .  Discussed with patient that could also be due to allergy drainageUp   Return if symptoms worsen or fail to improve.  Patient was given instructions and counseling regarding her condition or for health maintenance advice.  Please see specific information pulled into the AVS if appropriate.

## 2024-06-17 DIAGNOSIS — F41.9 ANXIETY: ICD-10-CM

## 2024-06-18 RX ORDER — FLUOXETINE HYDROCHLORIDE 20 MG/1
20 CAPSULE ORAL DAILY
Qty: 30 CAPSULE | Refills: 0 | Status: SHIPPED | OUTPATIENT
Start: 2024-06-18

## 2024-06-18 RX ORDER — FLUOXETINE 10 MG/1
10 CAPSULE ORAL DAILY
Qty: 30 CAPSULE | Refills: 0 | Status: SHIPPED | OUTPATIENT
Start: 2024-06-18

## 2024-07-19 ENCOUNTER — OFFICE VISIT (OUTPATIENT)
Dept: FAMILY MEDICINE CLINIC | Facility: CLINIC | Age: 34
End: 2024-07-19
Payer: COMMERCIAL

## 2024-07-19 VITALS
SYSTOLIC BLOOD PRESSURE: 110 MMHG | TEMPERATURE: 97.5 F | WEIGHT: 190 LBS | DIASTOLIC BLOOD PRESSURE: 68 MMHG | BODY MASS INDEX: 33.13 KG/M2 | OXYGEN SATURATION: 99 % | HEART RATE: 99 BPM

## 2024-07-19 DIAGNOSIS — R06.2 WHEEZING: ICD-10-CM

## 2024-07-19 DIAGNOSIS — J30.89 ALLERGIC RHINITIS DUE TO OTHER ALLERGIC TRIGGER, UNSPECIFIED SEASONALITY: ICD-10-CM

## 2024-07-19 DIAGNOSIS — E11.69 TYPE 2 DIABETES MELLITUS WITH OTHER SPECIFIED COMPLICATION, WITHOUT LONG-TERM CURRENT USE OF INSULIN: ICD-10-CM

## 2024-07-19 DIAGNOSIS — E66.01 CLASS 2 SEVERE OBESITY WITH SERIOUS COMORBIDITY AND BODY MASS INDEX (BMI) OF 39.0 TO 39.9 IN ADULT, UNSPECIFIED OBESITY TYPE: ICD-10-CM

## 2024-07-19 DIAGNOSIS — R05.2 SUBACUTE COUGH: ICD-10-CM

## 2024-07-19 DIAGNOSIS — F41.9 ANXIETY: Primary | ICD-10-CM

## 2024-07-19 PROCEDURE — 99214 OFFICE O/P EST MOD 30 MIN: CPT | Performed by: NURSE PRACTITIONER

## 2024-07-20 RX ORDER — FLUOXETINE HYDROCHLORIDE 40 MG/1
40 CAPSULE ORAL DAILY
Start: 2024-07-20

## 2024-07-20 NOTE — PROGRESS NOTES
Chief Complaint  Anxiety and Diabetes    PHQ-2 Total Score: 1   See scanned PHQ completed on downtime form.     Anxiety    Diabetes      Irlanda Maradiaga is a 33 y.o. female who presents to [facility] with a past medical history of  Past Medical History:   Diagnosis Date    Anxiety 08/2022    Gestational diabetes     Heart murmur Birth    Cleared up and released from cardiologist at 18 years old    Iron deficiency     Kidney stone 08/2016    Have had 3 kidney stones       HPI  Patient presents to the office today for follow-up and refills of medications for anxiety.  Patient is currently on fluoxetine 30 mg and notes that she has been on the medication for a while.  Patient feels like it is not working as well as it once had.  Patient also notes an increase in anxiety symptoms and would like to consider an increased dose of the fluoxetine.  Patient reports she is only taken the Atarax once in the last few months.    URI symptoms: Patient states that she was sick at the beginning of the year and was treated with a round of antibiotics and steroids but continues to have a lingering cough and wheezing.  Patient notes that the wheezing is worse when trying to lie down.    Allergic rhinitis: Patient reports that the Zyrtec helps with symptoms and needs a refill of medication.    Type 2 diabetes: Patient is taking Mounjaro and notes she is still losing weight while on the medication.  Patient denies nausea, vomiting, constipation.  Patient would like to remain at her current dose.  Patient reports eating a high-protein diet and walks a lot for exercise.  Weight is down about 10 pounds.  Patient denies low glucose readings.    Objective   Vital Signs:   Vitals:    07/19/24 1602   BP: 110/68   Pulse: 99   Temp: 97.5 °F (36.4 °C)   TempSrc: Temporal   SpO2: 99%   Weight: 86.2 kg (190 lb)     Body mass index is 33.13 kg/m².    Wt Readings from Last 3 Encounters:   07/19/24 86.2 kg (190 lb)    03/14/24 91.1 kg (200 lb 12.5 oz)   12/08/23 94.8 kg (209 lb)     BP Readings from Last 3 Encounters:   07/19/24 110/68   03/14/24 132/78   12/08/23 112/72       Health Maintenance   Topic Date Due    DIABETIC EYE EXAM  Never done    Hepatitis B (1 of 3 - 19+ 3-dose series) Never done    ANNUAL PHYSICAL  Never done    COVID-19 Vaccine (3 - 2023-24 season) 09/01/2023    URINE MICROALBUMIN  12/09/2023    HEMOGLOBIN A1C  06/08/2024    Pneumococcal Vaccine 0-64 (1 of 2 - PCV) 12/08/2024 (Originally 8/16/1997)    TDAP/TD VACCINES (1 - Tdap) 12/08/2024 (Originally 8/16/2010)    INFLUENZA VACCINE  08/01/2024    BMI FOLLOWUP  08/18/2024    DIABETIC FOOT EXAM  12/08/2024    PAP SMEAR  08/24/2025    HEPATITIS C SCREENING  Completed       Physical Exam  Vitals reviewed.   Constitutional:       General: She is not in acute distress.     Appearance: Normal appearance. She is well-developed.   HENT:      Head: Normocephalic and atraumatic.   Eyes:      Conjunctiva/sclera: Conjunctivae normal.      Pupils: Pupils are equal, round, and reactive to light.   Cardiovascular:      Rate and Rhythm: Normal rate and regular rhythm.      Heart sounds: Normal heart sounds.   Pulmonary:      Effort: Pulmonary effort is normal.      Breath sounds: Normal breath sounds.   Musculoskeletal:      Cervical back: Neck supple.      Right lower leg: No edema.      Left lower leg: No edema.   Skin:     General: Skin is warm and dry.   Neurological:      Mental Status: She is alert and oriented to person, place, and time.   Psychiatric:         Mood and Affect: Mood and affect normal.         Behavior: Behavior normal.         Thought Content: Thought content normal.         Judgment: Judgment normal.            Result Review :  The following data was reviewed by: NEYMAR Bah on 07/19/2024:  Common labs          8/11/2023    10:50 12/8/2023    08:43   Common Labs   Glucose 130  102    BUN 12  11    Creatinine 0.66  0.69    Sodium 137  139     Potassium 4.7  4.3    Chloride 101  105    Calcium 9.0  9.0    Albumin 4.1  4.4    Total Bilirubin 0.4  0.3    Alkaline Phosphatase 78  75    AST (SGOT) 15  10    ALT (SGPT) 16  15    WBC 8.98  5.66    Hemoglobin 12.8  13.3    Hematocrit 38.0  39.3    Platelets 283  273    Total Cholesterol 225  199    Triglycerides 129  96    HDL Cholesterol 44  36    LDL Cholesterol  158  145    Hemoglobin A1C 6.70  5.60        Procedures        Assessment and Plan   Diagnoses and all orders for this visit:    1. Anxiety (Primary)  -     FLUoxetine (PROzac) 40 MG capsule; Take 1 capsule by mouth Daily.    2. Allergic rhinitis due to other allergic trigger, unspecified seasonality    3. Type 2 diabetes mellitus with other specified complication, without long-term current use of insulin    4. Class 2 severe obesity with serious comorbidity and body mass index (BMI) of 39.0 to 39.9 in adult, unspecified obesity type    5. Subacute cough    6. Wheezing      Cough and wheeze: Patient was prescribed a Ventolin inhaler via paper prescription.  Patient notified to use as instructed and notify with no improvement in 2 weeks and will consider Singulair.  Patient was given an order for a chest x-ray via downtime form.    Mounjaro refilled via paper prescription.    Fluoxetine dose increased to 40 mg.  Patient to follow-up with her improvement in anxiety.      FOLLOW UP  No follow-ups on file.    Patient was given instructions and counseling regarding her condition or for health maintenance advice. Please see specific information pulled into the AVS if appropriate.       Olga Whyte, NEYMAR  07/20/24  06:55 EDT    CURRENT & DISCONTINUED MEDICATIONS  Current Outpatient Medications   Medication Instructions    Blood Glucose Monitoring Suppl (Accu-Chek Nara Plus) w/Device kit Use as directed to check glucose once daily    cetirizine (ZYRTEC) 10 mg, Oral, Daily    FLUoxetine (PROZAC) 40 mg, Oral, Daily    glucose blood (Accu-Chek Nara  Plus) test strip Use as instructed to monitor glucose once daily    hydrOXYzine (ATARAX) 10 mg, Oral, 3 Times Daily PRN    Lancets (accu-chek multiclix) lancets Use as directed to monitor glucose once daily    Tirzepatide (MOUNJARO) 10 mg, Subcutaneous, Weekly       Medications Discontinued During This Encounter   Medication Reason    azithromycin (Zithromax Z-Kaushik) 250 MG tablet *Therapy completed    dexAMETHasone (DECADRON) 4 MG tablet *Therapy completed    FLUoxetine (PROzac) 10 MG capsule Dose adjustment    FLUoxetine (PROzac) 20 MG capsule Reorder

## 2024-10-01 RX ORDER — ALBUTEROL SULFATE 90 UG/1
INHALANT RESPIRATORY (INHALATION)
Qty: 8.5 G | Refills: 0 | Status: SHIPPED | OUTPATIENT
Start: 2024-10-01

## 2024-11-19 ENCOUNTER — PATIENT MESSAGE (OUTPATIENT)
Dept: FAMILY MEDICINE CLINIC | Facility: CLINIC | Age: 34
End: 2024-11-19
Payer: COMMERCIAL

## 2024-11-19 DIAGNOSIS — J01.90 ACUTE SINUSITIS, RECURRENCE NOT SPECIFIED, UNSPECIFIED LOCATION: Primary | ICD-10-CM

## 2024-11-19 RX ORDER — AZITHROMYCIN 250 MG/1
TABLET, FILM COATED ORAL
Qty: 6 TABLET | Refills: 0 | Status: SHIPPED | OUTPATIENT
Start: 2024-11-19

## 2024-12-02 RX ORDER — ALBUTEROL SULFATE 90 UG/1
INHALANT RESPIRATORY (INHALATION)
Qty: 8.5 G | Refills: 0 | Status: SHIPPED | OUTPATIENT
Start: 2024-12-02

## 2024-12-30 DIAGNOSIS — F41.9 ANXIETY: ICD-10-CM

## 2024-12-30 DIAGNOSIS — J30.89 ALLERGIC RHINITIS DUE TO OTHER ALLERGIC TRIGGER, UNSPECIFIED SEASONALITY: ICD-10-CM

## 2024-12-30 RX ORDER — FLUOXETINE 40 MG/1
40 CAPSULE ORAL DAILY
Qty: 30 CAPSULE | Refills: 0 | Status: SHIPPED | OUTPATIENT
Start: 2024-12-30

## 2024-12-30 RX ORDER — ALBUTEROL SULFATE 90 UG/1
INHALANT RESPIRATORY (INHALATION)
Qty: 8.5 G | Refills: 0 | Status: SHIPPED | OUTPATIENT
Start: 2024-12-30

## 2024-12-30 RX ORDER — CETIRIZINE HYDROCHLORIDE 10 MG/1
10 TABLET ORAL DAILY
Qty: 30 TABLET | Refills: 0 | Status: SHIPPED | OUTPATIENT
Start: 2024-12-30

## 2025-01-29 ENCOUNTER — PATIENT MESSAGE (OUTPATIENT)
Dept: FAMILY MEDICINE CLINIC | Facility: CLINIC | Age: 35
End: 2025-01-29
Payer: COMMERCIAL

## 2025-01-30 DIAGNOSIS — F41.9 ANXIETY: ICD-10-CM

## 2025-01-30 DIAGNOSIS — R05.2 SUBACUTE COUGH: ICD-10-CM

## 2025-01-30 DIAGNOSIS — R06.2 WHEEZING: Primary | ICD-10-CM

## 2025-01-30 DIAGNOSIS — E11.69 TYPE 2 DIABETES MELLITUS WITH OTHER SPECIFIED COMPLICATION, WITHOUT LONG-TERM CURRENT USE OF INSULIN: ICD-10-CM

## 2025-01-31 DIAGNOSIS — J30.89 ALLERGIC RHINITIS DUE TO OTHER ALLERGIC TRIGGER, UNSPECIFIED SEASONALITY: ICD-10-CM

## 2025-01-31 RX ORDER — CETIRIZINE HYDROCHLORIDE 10 MG/1
10 TABLET ORAL DAILY
Qty: 30 TABLET | Refills: 0 | Status: SHIPPED | OUTPATIENT
Start: 2025-01-31

## 2025-01-31 RX ORDER — FLUOXETINE 40 MG/1
40 CAPSULE ORAL DAILY
Qty: 30 CAPSULE | Refills: 0 | OUTPATIENT
Start: 2025-01-31

## 2025-02-07 ENCOUNTER — OFFICE VISIT (OUTPATIENT)
Dept: FAMILY MEDICINE CLINIC | Facility: CLINIC | Age: 35
End: 2025-02-07
Payer: COMMERCIAL

## 2025-02-07 VITALS
BODY MASS INDEX: 30.58 KG/M2 | HEIGHT: 64 IN | WEIGHT: 179.1 LBS | TEMPERATURE: 98.1 F | OXYGEN SATURATION: 98 % | DIASTOLIC BLOOD PRESSURE: 80 MMHG | HEART RATE: 122 BPM | SYSTOLIC BLOOD PRESSURE: 118 MMHG

## 2025-02-07 DIAGNOSIS — R06.2 WHEEZING: Primary | ICD-10-CM

## 2025-02-07 DIAGNOSIS — J30.89 ALLERGIC RHINITIS DUE TO OTHER ALLERGIC TRIGGER, UNSPECIFIED SEASONALITY: ICD-10-CM

## 2025-02-07 DIAGNOSIS — F41.9 ANXIETY: ICD-10-CM

## 2025-02-07 DIAGNOSIS — E11.69 TYPE 2 DIABETES MELLITUS WITH OTHER SPECIFIED COMPLICATION, WITHOUT LONG-TERM CURRENT USE OF INSULIN: ICD-10-CM

## 2025-02-07 LAB
ALBUMIN SERPL-MCNC: 4.4 G/DL (ref 3.5–5.2)
ALBUMIN UR-MCNC: <1.2 MG/DL
ALBUMIN/GLOB SERPL: 1.4 G/DL
ALP SERPL-CCNC: 73 U/L (ref 39–117)
ALT SERPL W P-5'-P-CCNC: 15 U/L (ref 1–33)
ANION GAP SERPL CALCULATED.3IONS-SCNC: 9 MMOL/L (ref 5–15)
AST SERPL-CCNC: 14 U/L (ref 1–32)
BASOPHILS # BLD AUTO: 0.08 10*3/MM3 (ref 0–0.2)
BASOPHILS NFR BLD AUTO: 0.9 % (ref 0–1.5)
BILIRUB SERPL-MCNC: 0.2 MG/DL (ref 0–1.2)
BILIRUB UR QL STRIP: NEGATIVE
BUN SERPL-MCNC: 10 MG/DL (ref 6–20)
BUN/CREAT SERPL: 13.3 (ref 7–25)
CALCIUM SPEC-SCNC: 9.4 MG/DL (ref 8.6–10.5)
CHLORIDE SERPL-SCNC: 102 MMOL/L (ref 98–107)
CHOLEST SERPL-MCNC: 212 MG/DL (ref 0–200)
CLARITY UR: ABNORMAL
CO2 SERPL-SCNC: 27 MMOL/L (ref 22–29)
COLOR UR: YELLOW
CREAT SERPL-MCNC: 0.75 MG/DL (ref 0.57–1)
CREAT UR-MCNC: 196 MG/DL
DEPRECATED RDW RBC AUTO: 41.3 FL (ref 37–54)
EGFRCR SERPLBLD CKD-EPI 2021: 108 ML/MIN/1.73
EOSINOPHIL # BLD AUTO: 0.09 10*3/MM3 (ref 0–0.4)
EOSINOPHIL NFR BLD AUTO: 1 % (ref 0.3–6.2)
ERYTHROCYTE [DISTWIDTH] IN BLOOD BY AUTOMATED COUNT: 12 % (ref 12.3–15.4)
GLOBULIN UR ELPH-MCNC: 3.1 GM/DL
GLUCOSE SERPL-MCNC: 80 MG/DL (ref 65–99)
GLUCOSE UR STRIP-MCNC: NEGATIVE MG/DL
HBA1C MFR BLD: 5.4 % (ref 4.8–5.6)
HCT VFR BLD AUTO: 42.8 % (ref 34–46.6)
HDLC SERPL-MCNC: 50 MG/DL (ref 40–60)
HGB BLD-MCNC: 13.5 G/DL (ref 12–15.9)
HGB UR QL STRIP.AUTO: NEGATIVE
HOLD SPECIMEN: NORMAL
IMM GRANULOCYTES # BLD AUTO: 0.03 10*3/MM3 (ref 0–0.05)
IMM GRANULOCYTES NFR BLD AUTO: 0.3 % (ref 0–0.5)
KETONES UR QL STRIP: NEGATIVE
LDLC SERPL CALC-MCNC: 143 MG/DL (ref 0–100)
LDLC/HDLC SERPL: 2.81 {RATIO}
LEUKOCYTE ESTERASE UR QL STRIP.AUTO: NEGATIVE
LYMPHOCYTES # BLD AUTO: 2.77 10*3/MM3 (ref 0.7–3.1)
LYMPHOCYTES NFR BLD AUTO: 30.9 % (ref 19.6–45.3)
MCH RBC QN AUTO: 29.9 PG (ref 26.6–33)
MCHC RBC AUTO-ENTMCNC: 31.5 G/DL (ref 31.5–35.7)
MCV RBC AUTO: 94.7 FL (ref 79–97)
MICROALBUMIN/CREAT UR: NORMAL MG/G{CREAT}
MONOCYTES # BLD AUTO: 0.66 10*3/MM3 (ref 0.1–0.9)
MONOCYTES NFR BLD AUTO: 7.4 % (ref 5–12)
NEUTROPHILS NFR BLD AUTO: 5.32 10*3/MM3 (ref 1.7–7)
NEUTROPHILS NFR BLD AUTO: 59.5 % (ref 42.7–76)
NITRITE UR QL STRIP: NEGATIVE
NRBC BLD AUTO-RTO: 0 /100 WBC (ref 0–0.2)
PH UR STRIP.AUTO: 7 [PH] (ref 5–8)
PLATELET # BLD AUTO: 329 10*3/MM3 (ref 140–450)
PMV BLD AUTO: 10.8 FL (ref 6–12)
POTASSIUM SERPL-SCNC: 4.1 MMOL/L (ref 3.5–5.2)
PROT SERPL-MCNC: 7.5 G/DL (ref 6–8.5)
PROT UR QL STRIP: ABNORMAL
RBC # BLD AUTO: 4.52 10*6/MM3 (ref 3.77–5.28)
SODIUM SERPL-SCNC: 138 MMOL/L (ref 136–145)
SP GR UR STRIP: 1.02 (ref 1–1.03)
TRIGL SERPL-MCNC: 108 MG/DL (ref 0–150)
TSH SERPL DL<=0.05 MIU/L-ACNC: 1.15 UIU/ML (ref 0.27–4.2)
UROBILINOGEN UR QL STRIP: ABNORMAL
VLDLC SERPL-MCNC: 19 MG/DL (ref 5–40)
WBC NRBC COR # BLD AUTO: 8.95 10*3/MM3 (ref 3.4–10.8)

## 2025-02-07 PROCEDURE — 83036 HEMOGLOBIN GLYCOSYLATED A1C: CPT | Performed by: NURSE PRACTITIONER

## 2025-02-07 PROCEDURE — 82570 ASSAY OF URINE CREATININE: CPT | Performed by: NURSE PRACTITIONER

## 2025-02-07 PROCEDURE — 81003 URINALYSIS AUTO W/O SCOPE: CPT | Performed by: NURSE PRACTITIONER

## 2025-02-07 PROCEDURE — 82043 UR ALBUMIN QUANTITATIVE: CPT | Performed by: NURSE PRACTITIONER

## 2025-02-07 PROCEDURE — 99214 OFFICE O/P EST MOD 30 MIN: CPT | Performed by: NURSE PRACTITIONER

## 2025-02-07 PROCEDURE — 80050 GENERAL HEALTH PANEL: CPT | Performed by: NURSE PRACTITIONER

## 2025-02-07 PROCEDURE — 80061 LIPID PANEL: CPT | Performed by: NURSE PRACTITIONER

## 2025-02-07 RX ORDER — ALBUTEROL SULFATE 90 UG/1
2 INHALANT RESPIRATORY (INHALATION) EVERY 4 HOURS PRN
Qty: 18 G | Refills: 1 | Status: SHIPPED | OUTPATIENT
Start: 2025-02-07

## 2025-02-07 RX ORDER — FLUOXETINE 40 MG/1
40 CAPSULE ORAL DAILY
Qty: 90 CAPSULE | Refills: 1 | Status: SHIPPED | OUTPATIENT
Start: 2025-02-07

## 2025-02-07 RX ORDER — CETIRIZINE HYDROCHLORIDE 10 MG/1
10 TABLET ORAL DAILY
Qty: 90 TABLET | Refills: 1 | Status: SHIPPED | OUTPATIENT
Start: 2025-02-07

## 2025-02-07 NOTE — PROGRESS NOTES
Chief Complaint  Anxiety, Allergic Rhinitis, and Diabetes (Would like to discuss increasing her mounjaro )    Little interest or pleasure in doing things? Not at all   Feeling down, depressed, or hopeless? Not at all   PHQ-2 Total Score 0          Check in / refills  Pertinent negative symptoms include no abdominal pain, no anorexia, no joint pain, no change in stool, no chest pain, no chills, no congestion, no cough, no diaphoresis, no fatigue, no fever, no headaches, no joint swelling, no myalgias, no nausea, no neck pain, no numbness, no rash, no sore throat, no swollen glands, no dysuria, no vertigo, no visual change, no vomiting and no weakness.     Irlanda Maradiaga is a 34 y.o. female who presents to [facility] with a past medical history of  Past Medical History:   Diagnosis Date   • Anxiety 08/2022   • Gestational diabetes    • Heart murmur Birth    Cleared up and released from cardiologist at 18 years old   • Iron deficiency    • Kidney stone 08/2016    Have had 3 kidney stones       HPI     The patient is a 34-year-old female who presents for evaluation of diabetes and wheezing.    She has not been monitoring her blood glucose levels regularly but reports no recent episodes of hypoglycemia or hyperglycemia. Approximately one month ago, she recorded a blood glucose level of 101. She is currently on Mounjaro for diabetes management and is considering an increase in dosage due to a perceived plateau in her weight loss over the past 3 to 4 months. Her diet is characterized by high protein and low carbohydrate intake, with occasional indulgence in pizza. She consumes a protein shake daily and ensures that each meal includes a source of protein such as cottage cheese or chicken, aiming for a total daily protein intake of around 100 grams.    She continues to experience wheezing and relies on albuterol, which she uses several times daily, including before bedtime as previously  "advised. She reports no history of asthma, smoking, or vaping. She does not experience symptoms suggestive of allergies such as congestion, drainage, or sore throat, and believes her allergy medication is effective. She does, however, experience morning nasal discharge. She recalls a recent visit to her mother-in-law's house, which has carpet and cats, where she experienced persistent coughing. She has not undergone allergy testing. She does not report excessive belching.    Supplemental Information  She is also on Zyrtec and Prozac. She takes hydroxyzine as needed but has not used it in a while.    SOCIAL HISTORY  She reports no history of smoking or use of vapes. She works at home for [employer].    MEDICATIONS  Current: Albuterol, Zyrtec, Prozac, hydroxyzine (as needed).       Objective   Vital Signs:   Vitals:    02/07/25 1129   BP: 118/80   BP Location: Left arm   Patient Position: Sitting   Pulse: (!) 122   Temp: 98.1 °F (36.7 °C)   SpO2: 98%   Weight: 81.2 kg (179 lb 1.6 oz)   Height: 161.3 cm (63.5\")   PainSc: 0-No pain     Body mass index is 31.23 kg/m².    Wt Readings from Last 3 Encounters:   02/07/25 81.2 kg (179 lb 1.6 oz)   07/19/24 86.2 kg (190 lb)   03/14/24 91.1 kg (200 lb 12.5 oz)     BP Readings from Last 3 Encounters:   02/07/25 118/80   07/19/24 110/68   03/14/24 132/78       Health Maintenance   Topic Date Due   • DIABETIC EYE EXAM  Never done   • ANNUAL PHYSICAL  Never done   • URINE MICROALBUMIN  12/09/2023   • HEMOGLOBIN A1C  06/08/2024   • BMI FOLLOWUP  08/18/2024   • DIABETIC FOOT EXAM  12/08/2024   • COVID-19 Vaccine (3 - 2024-25 season) 03/31/2025 (Originally 9/1/2024)   • INFLUENZA VACCINE  03/31/2025 (Originally 7/1/2024)   • Hepatitis B (1 of 3 - 19+ 3-dose series) 02/07/2026 (Originally 8/16/2010)   • Pneumococcal Vaccine 0-64 (1 of 2 - PCV) 02/07/2026 (Originally 8/16/2010)   • TDAP/TD VACCINES (1 - Tdap) 02/07/2026 (Originally 8/16/2010)   • PAP SMEAR  08/24/2025   • HEPATITIS C " SCREENING  Completed       Physical Exam  Vitals reviewed.   Constitutional:       General: She is not in acute distress.     Appearance: Normal appearance. She is well-developed.   HENT:      Head: Normocephalic and atraumatic.      Right Ear: External ear normal.      Left Ear: External ear normal.   Eyes:      Conjunctiva/sclera: Conjunctivae normal.      Pupils: Pupils are equal, round, and reactive to light.   Cardiovascular:      Rate and Rhythm: Normal rate and regular rhythm.      Heart sounds: Normal heart sounds.   Pulmonary:      Effort: Pulmonary effort is normal.      Breath sounds: Normal breath sounds.   Musculoskeletal:      Cervical back: Neck supple.      Right lower leg: No edema.      Left lower leg: No edema.   Skin:     General: Skin is warm and dry.   Neurological:      Mental Status: She is alert and oriented to person, place, and time.   Psychiatric:         Mood and Affect: Mood and affect normal.         Behavior: Behavior normal.         Thought Content: Thought content normal.         Judgment: Judgment normal.          Result Review :  The following data was reviewed by: NEYMAR Bah on 02/07/2025:  Results  Laboratory Studies  Blood sugar was 101 about a month ago.    Imaging  X-ray of the lungs shows a little bit of narrowing.           Procedures        Assessment and Plan   Diagnoses and all orders for this visit:    1. Wheezing (Primary)  -     albuterol sulfate  (90 Base) MCG/ACT inhaler; Inhale 2 puffs Every 4 (Four) Hours As Needed for Wheezing.  Dispense: 18 g; Refill: 1  -     Ambulatory Referral to Allergy    2. Allergic rhinitis due to other allergic trigger, unspecified seasonality  -     cetirizine (zyrTEC) 10 MG tablet; Take 1 tablet by mouth Daily.  Dispense: 90 tablet; Refill: 1  -     Ambulatory Referral to Allergy    3. Anxiety  -     FLUoxetine (PROzac) 40 MG capsule; Take 1 capsule by mouth Daily.  Dispense: 90 capsule; Refill: 1    4. Type 2  diabetes mellitus with other specified complication, without long-term current use of insulin  -     Urinalysis With Culture If Indicated - Urine, Clean Catch  -     CBC & Differential  -     Comprehensive Metabolic Panel  -     Hemoglobin A1c  -     Lipid Panel  -     TSH Rfx On Abnormal To Free T4  -     Microalbumin / Creatinine Urine Ratio - Urine, Clean Catch         1. Diabetes mellitus.  Her blood glucose levels have been stable, with a reading of 101 approximately one month ago. She has experienced a weight loss of 21 pounds since March 2024. She is advised to increase her daily protein intake to between 130 and 140 grams and incorporate additional physical activity into her routine, including walking and weight training exercises such as squats and push-ups. A fasting blood work will be conducted today to assess her A1c levels. Her current medication regimen will be maintained until the results of the A1c test are available.    2. Wheezing.  She continues to experience wheezing and uses her albuterol inhaler daily, sometimes multiple times a day. She reports no history of asthma, smoking, or vaping. She experiences increased symptoms in environments with potential allergens, such as carpet and cat dander. A referral to an allergy and asthma specialist will be made for further evaluation and potential allergy testing. The results of her recent x-ray will be communicated to her once they are available.     BMI is >= 30 and <35. (Class 1 Obesity). The following options were offered after discussion;: weight loss educational material (shared in after visit summary)         FOLLOW UP  No follow-ups on file.    Patient was given instructions and counseling regarding her condition or for health maintenance advice. Please see specific information pulled into the AVS if appropriate.       Olga Whyte, APRN  02/07/25  12:28 EST    CURRENT & DISCONTINUED MEDICATIONS  Current Outpatient Medications   Medication  Instructions   • albuterol sulfate  (90 Base) MCG/ACT inhaler 2 puffs, Inhalation, Every 4 Hours PRN   • Blood Glucose Monitoring Suppl (Accu-Chek Nara Plus) w/Device kit Use as directed to check glucose once daily   • cetirizine (ZYRTEC) 10 mg, Oral, Daily   • FLUoxetine (PROZAC) 40 mg, Oral, Daily   • glucose blood (Accu-Chek Nara Plus) test strip Use as instructed to monitor glucose once daily   • hydrOXYzine (ATARAX) 10 mg, Oral, 3 Times Daily PRN   • Lancets (accu-chek multiclix) lancets Use as directed to monitor glucose once daily   • Tirzepatide 10 mg, Subcutaneous, Weekly       Medications Discontinued During This Encounter   Medication Reason   • azithromycin (Zithromax Z-Kaushik) 250 MG tablet *Therapy completed   • FLUoxetine (PROzac) 40 MG capsule Reorder   • albuterol sulfate  (90 Base) MCG/ACT inhaler Reorder   • cetirizine (zyrTEC) 10 MG tablet Reorder        Patient or patient representative verbalized consent for the use of Ambient Listening during the visit with  NEYMAR Bah for chart documentation. 2/7/2025  12:27 EST

## 2025-02-07 NOTE — PROGRESS NOTES
Venipuncture Blood Specimen Collection  Venipuncture performed in left arm by Maria Guadalupe Ramos with good hemostasis. Patient tolerated the procedure well without complications.   02/07/25   Maria Guadalupe Ramos

## 2025-02-10 DIAGNOSIS — E11.69 TYPE 2 DIABETES MELLITUS WITH OTHER SPECIFIED COMPLICATION, WITHOUT LONG-TERM CURRENT USE OF INSULIN: ICD-10-CM

## 2025-02-10 DIAGNOSIS — E11.69 TYPE 2 DIABETES MELLITUS WITH OTHER SPECIFIED COMPLICATION, WITHOUT LONG-TERM CURRENT USE OF INSULIN: Primary | ICD-10-CM

## 2025-02-10 RX ORDER — TIRZEPATIDE 10 MG/.5ML
INJECTION, SOLUTION SUBCUTANEOUS
Qty: 6 ML | OUTPATIENT
Start: 2025-02-10

## 2025-02-15 ENCOUNTER — PATIENT MESSAGE (OUTPATIENT)
Dept: FAMILY MEDICINE CLINIC | Facility: CLINIC | Age: 35
End: 2025-02-15
Payer: COMMERCIAL

## 2025-02-15 DIAGNOSIS — J30.89 ALLERGIC RHINITIS DUE TO OTHER ALLERGIC TRIGGER, UNSPECIFIED SEASONALITY: ICD-10-CM

## 2025-02-18 RX ORDER — CETIRIZINE HYDROCHLORIDE 10 MG/1
10 TABLET ORAL 2 TIMES DAILY
Qty: 180 TABLET | Refills: 0 | Status: SHIPPED | OUTPATIENT
Start: 2025-02-18

## 2025-04-02 DIAGNOSIS — R06.2 WHEEZING: ICD-10-CM

## 2025-04-02 RX ORDER — ALBUTEROL SULFATE 90 UG/1
2 INHALANT RESPIRATORY (INHALATION) EVERY 4 HOURS PRN
Qty: 8.5 G | Refills: 0 | Status: SHIPPED | OUTPATIENT
Start: 2025-04-02

## 2025-05-02 DIAGNOSIS — R06.2 WHEEZING: ICD-10-CM

## 2025-05-03 RX ORDER — ALBUTEROL SULFATE 90 UG/1
2 INHALANT RESPIRATORY (INHALATION) EVERY 4 HOURS PRN
Qty: 8.5 G | Refills: 0 | Status: SHIPPED | OUTPATIENT
Start: 2025-05-03

## 2025-05-09 ENCOUNTER — OFFICE VISIT (OUTPATIENT)
Dept: FAMILY MEDICINE CLINIC | Facility: CLINIC | Age: 35
End: 2025-05-09
Payer: COMMERCIAL

## 2025-05-09 VITALS
WEIGHT: 183.6 LBS | HEART RATE: 135 BPM | SYSTOLIC BLOOD PRESSURE: 110 MMHG | OXYGEN SATURATION: 100 % | TEMPERATURE: 97.7 F | DIASTOLIC BLOOD PRESSURE: 80 MMHG | HEIGHT: 64 IN | BODY MASS INDEX: 31.34 KG/M2

## 2025-05-09 DIAGNOSIS — M13.0 POLYARTHRITIS: ICD-10-CM

## 2025-05-09 DIAGNOSIS — L30.9 DERMATITIS: Primary | ICD-10-CM

## 2025-05-09 LAB
ALBUMIN SERPL-MCNC: 4.2 G/DL (ref 3.5–5.2)
ALBUMIN/GLOB SERPL: 1.3 G/DL
ALP SERPL-CCNC: 87 U/L (ref 39–117)
ALT SERPL W P-5'-P-CCNC: 26 U/L (ref 1–33)
ANION GAP SERPL CALCULATED.3IONS-SCNC: 14 MMOL/L (ref 5–15)
AST SERPL-CCNC: 32 U/L (ref 1–32)
BASOPHILS # BLD AUTO: 0.03 10*3/MM3 (ref 0–0.2)
BASOPHILS NFR BLD AUTO: 0.5 % (ref 0–1.5)
BILIRUB SERPL-MCNC: 0.2 MG/DL (ref 0–1.2)
BUN SERPL-MCNC: 11 MG/DL (ref 6–20)
BUN/CREAT SERPL: 17.5 (ref 7–25)
CALCIUM SPEC-SCNC: 8.9 MG/DL (ref 8.6–10.5)
CHLORIDE SERPL-SCNC: 100 MMOL/L (ref 98–107)
CHROMATIN AB SERPL-ACNC: 11 IU/ML (ref 0–14)
CO2 SERPL-SCNC: 24 MMOL/L (ref 22–29)
CREAT SERPL-MCNC: 0.63 MG/DL (ref 0.57–1)
CRP SERPL-MCNC: 4.69 MG/DL (ref 0–0.5)
DEPRECATED RDW RBC AUTO: 41.1 FL (ref 37–54)
EGFRCR SERPLBLD CKD-EPI 2021: 120.3 ML/MIN/1.73
EOSINOPHIL # BLD AUTO: 0.07 10*3/MM3 (ref 0–0.4)
EOSINOPHIL NFR BLD AUTO: 1.1 % (ref 0.3–6.2)
ERYTHROCYTE [DISTWIDTH] IN BLOOD BY AUTOMATED COUNT: 12.7 % (ref 12.3–15.4)
ERYTHROCYTE [SEDIMENTATION RATE] IN BLOOD: 15 MM/HR (ref 0–20)
GLOBULIN UR ELPH-MCNC: 3.3 GM/DL
GLUCOSE SERPL-MCNC: 77 MG/DL (ref 65–99)
HCT VFR BLD AUTO: 38.6 % (ref 34–46.6)
HGB BLD-MCNC: 12.6 G/DL (ref 12–15.9)
IMM GRANULOCYTES # BLD AUTO: 0.04 10*3/MM3 (ref 0–0.05)
IMM GRANULOCYTES NFR BLD AUTO: 0.6 % (ref 0–0.5)
LYMPHOCYTES # BLD AUTO: 0.83 10*3/MM3 (ref 0.7–3.1)
LYMPHOCYTES NFR BLD AUTO: 12.9 % (ref 19.6–45.3)
MCH RBC QN AUTO: 29.4 PG (ref 26.6–33)
MCHC RBC AUTO-ENTMCNC: 32.6 G/DL (ref 31.5–35.7)
MCV RBC AUTO: 90.2 FL (ref 79–97)
MONOCYTES # BLD AUTO: 0.5 10*3/MM3 (ref 0.1–0.9)
MONOCYTES NFR BLD AUTO: 7.8 % (ref 5–12)
NEUTROPHILS NFR BLD AUTO: 4.97 10*3/MM3 (ref 1.7–7)
NEUTROPHILS NFR BLD AUTO: 77.1 % (ref 42.7–76)
PLATELET # BLD AUTO: 211 10*3/MM3 (ref 140–450)
PMV BLD AUTO: 10.5 FL (ref 6–12)
POTASSIUM SERPL-SCNC: 4.1 MMOL/L (ref 3.5–5.2)
PROT SERPL-MCNC: 7.5 G/DL (ref 6–8.5)
RBC # BLD AUTO: 4.28 10*6/MM3 (ref 3.77–5.28)
SODIUM SERPL-SCNC: 138 MMOL/L (ref 136–145)
WBC NRBC COR # BLD AUTO: 6.44 10*3/MM3 (ref 3.4–10.8)

## 2025-05-09 PROCEDURE — 86003 ALLG SPEC IGE CRUDE XTRC EA: CPT | Performed by: NURSE PRACTITIONER

## 2025-05-09 PROCEDURE — 86038 ANTINUCLEAR ANTIBODIES: CPT | Performed by: NURSE PRACTITIONER

## 2025-05-09 PROCEDURE — 86008 ALLG SPEC IGE RECOMB EA: CPT | Performed by: NURSE PRACTITIONER

## 2025-05-09 PROCEDURE — 86618 LYME DISEASE ANTIBODY: CPT | Performed by: NURSE PRACTITIONER

## 2025-05-09 PROCEDURE — 80053 COMPREHEN METABOLIC PANEL: CPT | Performed by: NURSE PRACTITIONER

## 2025-05-09 PROCEDURE — 86431 RHEUMATOID FACTOR QUANT: CPT | Performed by: NURSE PRACTITIONER

## 2025-05-09 PROCEDURE — 82785 ASSAY OF IGE: CPT | Performed by: NURSE PRACTITIONER

## 2025-05-09 PROCEDURE — 85652 RBC SED RATE AUTOMATED: CPT | Performed by: NURSE PRACTITIONER

## 2025-05-09 PROCEDURE — 85025 COMPLETE CBC W/AUTO DIFF WBC: CPT | Performed by: NURSE PRACTITIONER

## 2025-05-09 PROCEDURE — 87798 DETECT AGENT NOS DNA AMP: CPT | Performed by: NURSE PRACTITIONER

## 2025-05-09 PROCEDURE — 86140 C-REACTIVE PROTEIN: CPT | Performed by: NURSE PRACTITIONER

## 2025-05-09 RX ORDER — PREDNISONE 5 MG/1
TABLET ORAL
Qty: 1 EACH | Refills: 0 | Status: SHIPPED | OUTPATIENT
Start: 2025-05-09

## 2025-05-09 RX ORDER — FAMOTIDINE 20 MG/1
20 TABLET, FILM COATED ORAL 2 TIMES DAILY
Qty: 28 TABLET | Refills: 0 | Status: SHIPPED | OUTPATIENT
Start: 2025-05-09

## 2025-05-09 NOTE — PROGRESS NOTES
..  Venipuncture Blood Specimen Collection  Venipuncture performed in leftarm by Aaliyah North with good hemostasis. Patient tolerated the procedure well without complications.   05/09/25   Aaliyah North

## 2025-05-09 NOTE — PROGRESS NOTES
Chief Complaint  Rash (Went to the urgent care last sat and was given a steroid injections. Having joint pain, fever. Pt feels like her throat is swelling. )    The PHQ has not been completed during this encounter.       Rash  Chronicity:  New  Onset:  1 to 4 weeks ago  Progression since onset:  Coming and going  Affected locations:  Diffuse  Characteristics:  Pain and itchiness  Exposed to:  Nothing and unknown  Associated symptoms: facial edema, fatigue, fever, joint pain and sore throat    Associated symptoms: no anorexia, no congestion, no cough, no diarrhea, no nail changes, no rhinorrhea, no shortness of breath and no vomiting    Additional Information:  I got a steroid shot from urgent care and it hasnt really helped    Irlanda Maradiaga is a 34 y.o. female who presents to [facility] with a past medical history of  Past Medical History:   Diagnosis Date    Anxiety 08/2022    Gestational diabetes     Heart murmur Birth    Cleared up and released from cardiologist at 18 years old    Iron deficiency     Kidney stone 08/2016    Have had 3 kidney stones       HPI     The patient is a 34-year-old female who presents for evaluation of a rash.    She reports experiencing pruritus, which she describes as intense and difficult to resist scratching. The rash is present on her elbows, left arm, chest, and left cheekbone. She also reports a sensation of throat swelling and painful lymph nodes around her ears. The rash extends to her hands and feet, and she has noticed it on her scalp. She reports no recent changes in her use of soaps, lotions, or detergents. She recalls outdoor activities a few weeks prior but reports no recent tick bites. She has not started any new medications recently, except for a steroid injection and cream provided at an urgent care visit. She has been taking Benadryl, which provides some relief, and hydroxyzine, which was not helpful. She has been taking oatmeal  "baths for relief.     She has experienced mild nausea but no abdominal pain or vomiting. She has had fevers exceeding 100 degrees on three occasions. No one else at home is sick. She reports no history of eczema. She sought medical attention at an urgent care facility on 05/03/2025, where she received a steroid injection. Despite the treatment, she continues to experience the rash. She was tested for COVID-19, influenza, and strep, all of which returned negative results.    She has been experiencing joint pain, particularly in her wrist, knees, and ankles, which has limited her mobility. She has been feeling fatigued and has noticed a decrease in her appetite.    Her diabetes is currently well controlled.       Objective   Vital Signs:   Vitals:    05/09/25 1129   BP: 110/80   BP Location: Left arm   Patient Position: Sitting   Pulse: (!) 135   Temp: 97.7 °F (36.5 °C)   SpO2: 100%   Weight: 83.3 kg (183 lb 9.6 oz)   Height: 161.3 cm (63.5\")     Body mass index is 32.01 kg/m².    Wt Readings from Last 3 Encounters:   05/09/25 83.3 kg (183 lb 9.6 oz)   05/03/25 81.6 kg (180 lb)   02/07/25 81.2 kg (179 lb 1.6 oz)     BP Readings from Last 3 Encounters:   05/09/25 110/80   05/03/25 118/85   02/07/25 118/80       Health Maintenance   Topic Date Due    DIABETIC EYE EXAM  Never done    ANNUAL PHYSICAL  Never done    DIABETIC FOOT EXAM  12/08/2024    HEMOGLOBIN A1C  08/07/2025    COVID-19 Vaccine (3 - 2024-25 season) 11/09/2025 (Originally 9/1/2024)    Hepatitis B (1 of 3 - 19+ 3-dose series) 02/07/2026 (Originally 8/16/2010)    Pneumococcal Vaccine 0-49 (1 of 2 - PCV) 02/07/2026 (Originally 8/16/2010)    TDAP/TD VACCINES (1 - Tdap) 02/07/2026 (Originally 8/16/2010)    INFLUENZA VACCINE  07/01/2025    PAP SMEAR  08/24/2025    URINE MICROALBUMIN-CREATININE RATIO (uACR)  02/07/2026    HEPATITIS C SCREENING  Completed       Physical Exam  Vitals reviewed.   Constitutional:       General: She is not in acute distress.     " Appearance: Normal appearance. She is well-developed.   HENT:      Head: Normocephalic and atraumatic.      Right Ear: Tympanic membrane, ear canal and external ear normal.      Left Ear: Tympanic membrane, ear canal and external ear normal.      Mouth/Throat:      Mouth: Mucous membranes are moist.      Pharynx: Oropharynx is clear.   Eyes:      Conjunctiva/sclera: Conjunctivae normal.      Pupils: Pupils are equal, round, and reactive to light.   Cardiovascular:      Rate and Rhythm: Normal rate and regular rhythm.      Heart sounds: Normal heart sounds.   Pulmonary:      Effort: Pulmonary effort is normal.      Breath sounds: Normal breath sounds.   Musculoskeletal:      Right lower leg: No edema.      Left lower leg: No edema.   Lymphadenopathy:      Cervical: Cervical adenopathy present.   Skin:     General: Skin is warm and dry.   Neurological:      Mental Status: She is alert and oriented to person, place, and time.   Psychiatric:         Mood and Affect: Mood and affect normal.         Behavior: Behavior normal.         Thought Content: Thought content normal.         Judgment: Judgment normal.            Result Review :  The following data was reviewed by: NEYMAR Bah on 05/09/2025:  Results  Labs   - COVID-19 test: Negative   - Flu test: Negative   - Strep test: Negative     Common labs          2/7/2025    12:02   Common Labs   Glucose 80    BUN 10    Creatinine 0.75    Sodium 138    Potassium 4.1    Chloride 102    Calcium 9.4    Albumin 4.4    Total Bilirubin 0.2    Alkaline Phosphatase 73    AST (SGOT) 14    ALT (SGPT) 15    WBC 8.95    Hemoglobin 13.5    Hematocrit 42.8    Platelets 329    Total Cholesterol 212    Triglycerides 108    HDL Cholesterol 50    LDL Cholesterol  143    Hemoglobin A1C 5.40    Microalbumin, Urine <1.2      TSH          2/7/2025    12:02   TSH   TSH 1.150        Procedures        Assessment and Plan   Diagnoses and all orders for this visit:    1. Dermatitis  (Primary)  -     Alpha-Gal IgE Panel  -     Allergens (52) Food  -     CBC & Differential  -     Comprehensive Metabolic Panel  -     ULISES Direct Reflex to 11 Biomarker  -     C-reactive protein  -     Rheumatoid Factor  -     Sedimentation rate  -     Rickettsia Species DNA, Real-Time PCR  -     Lyme Disease Total Antibody With Reflex to Immunoassay  -     predniSONE 5 MG (21) tablet therapy pack dose pack; Take as directed on package instructions.  Dispense: 1 each; Refill: 0  -     famotidine (PEPCID) 20 MG tablet; Take 1 tablet by mouth 2 (Two) Times a Day.  Dispense: 28 tablet; Refill: 0    2. Polyarthritis  -     Alpha-Gal IgE Panel  -     Allergens (52) Food  -     CBC & Differential  -     Comprehensive Metabolic Panel  -     ULISES Direct Reflex to 11 Biomarker  -     C-reactive protein  -     Rheumatoid Factor  -     Sedimentation rate  -     Rickettsia Species DNA, Real-Time PCR  -     Lyme Disease Total Antibody With Reflex to Immunoassay         1. Dermatitis.  - Presents with a rash on elbows, left arm, chest, left cheekbone, hands, feet, and scalp; associated with itching, swollen lymph nodes, and joint pain.  - Physical examination reveals rash and swollen lymph nodes; patient reports fever episodes over 100°F.  - Differential diagnoses include dermatomyositis and Leonid Mountain spotted fever; comprehensive lab tests ordered including blood counts, blood sugar, kidney and liver function tests, ULISES, CRP, rheumatoid factor, sed rate, food allergy panel, alpha-gal, and Leonid Mountain spotted fever.  - Prescribed a steroid Dosepak to manage itching and rash; advised to continue Benadryl and start a histamine 2 blocker such as Pepcid.    2. Polyarthritis.  - Reports significant joint pain in wrists, knees, and ankles; associated with underlying dermatitis.  - Physical examination confirms joint pain; patient mentions joint pain has been persistent and severe.  - Evaluation includes the same comprehensive lab  tests as mentioned for dermatitis to assess for inflammatory conditions.  - Management includes the prescribed steroid Dosepak and continuation of antihistamines.    3. Diabetes Mellitus.  - Diabetes is currently well-controlled.  - Last A1c check was three months ago and was within target range.  - No need to recheck A1c today; continue current diabetes management plan.               FOLLOW UP  Return if symptoms worsen or fail to improve.    Patient was given instructions and counseling regarding her condition or for health maintenance advice. Please see specific information pulled into the AVS if appropriate.       NEYMAR Bah  05/09/25  13:46 EDT    CURRENT & DISCONTINUED MEDICATIONS  Current Outpatient Medications   Medication Instructions    albuterol sulfate  (90 Base) MCG/ACT inhaler 2 puffs, Every 4 Hours PRN    cetirizine (ZYRTEC) 10 mg, Oral, 2 Times Daily    famotidine (PEPCID) 20 mg, Oral, 2 Times Daily    FLUoxetine (PROZAC) 40 mg, Oral, Daily    hydrocortisone 1 % ointment 1 Application, Topical, 2 Times Daily, Do not use to area for more that 2 weeks    hydrOXYzine (ATARAX) 10 mg, Oral, 3 Times Daily PRN    predniSONE 5 MG (21) tablet therapy pack dose pack Take as directed on package instructions.    Tirzepatide 10 mg, Subcutaneous, Every 7 Days       Medications Discontinued During This Encounter   Medication Reason    glucose blood (Accu-Chek Nara Plus) test strip *Therapy completed    Blood Glucose Monitoring Suppl (Accu-Chek Nara Plus) w/Device kit *Therapy completed    Lancets (accu-chek multiclix) lancets *Therapy completed        Patient or patient representative verbalized consent for the use of Ambient Listening during the visit with  NEYMAR Bah for chart documentation. 5/9/2025  12:05 EDT

## 2025-05-10 LAB — B BURGDOR IGG+IGM SER QL IA: NEGATIVE

## 2025-05-12 ENCOUNTER — PATIENT MESSAGE (OUTPATIENT)
Dept: FAMILY MEDICINE CLINIC | Facility: CLINIC | Age: 35
End: 2025-05-12
Payer: COMMERCIAL

## 2025-05-12 LAB — ANA SER QL: NEGATIVE

## 2025-05-14 LAB
A-LACTALB IGE QN: <0.1 KU/L
ALMOND IGE QN: <0.1 KU/L
ALPHA-GAL IGE QN: <0.1 KU/L
APPLE IGE QN: <0.1 KU/L
AVOCADO IGE QN: <0.1 KU/L
BAKER'S YEAST IGE QN: <0.1 KU/L
BANANA IGE QN: <0.1 KU/L
BEEF IGE QN: <0.1 KU/L
BEEF IGE QN: <0.1 KU/L
BLACK PEPPER IGE QN: <0.1 KU/L
BROCCOLI IGE QN: <0.1 KU/L
CABBAGE IGE QN: <0.1 KU/L
CARROT IGE QN: <0.1 KU/L
CASHEW NUT IGE QN: <0.1 KU/L
CHEESE MOLD IGE QN: <0.1 KU/L
CHICKEN MEAT IGE QN: <0.1 KU/L
COCOA IGE QN: <0.1 KU/L
COFFEE IGE QN: <0.1 KU/L
CONV CLASS DESCRIPTION: NORMAL
CONV CLASS DESCRIPTION: NORMAL
CORN IGE QN: <0.1 KU/L
COW MILK IGE QN: <0.1 KU/L
CRAB IGE QN: <0.1 KU/L
EGG WHITE IGE QN: <0.1 KU/L
EGG YOLK IGE QN: <0.1 KU/L
GARLIC IGE QN: <0.1 KU/L
GRAPE IGE QN: <0.1 KU/L
GREEN BEAN IGE QN: <0.1 KU/L
HADDOCK IGE QN: <0.1 KU/L
HALIBUT IGE QN: <0.1 KU/L
HAZELNUT IGE QN: <0.1 KU/L
IGE SERPL-ACNC: 67 IU/ML (ref 6–495)
LAMB IGE QN: <0.1 KU/L
LAMB IGE QN: <0.1 KU/L
LEMON IGE QN: <0.1 KU/L
MELON IGE QN: <0.1 KU/L
MUSHROOM IGE QN: <0.1 KU/L
OAT IGE QN: <0.1 KU/L
ONION IGE QN: <0.1 KU/L
ORANGE IGE QN: <0.1 KU/L
OYSTER IGE QN: <0.1 KU/L
PAPRIKA IGE QN: <0.1 KU/L
PEANUT IGE QN: <0.1 KU/L
PORK IGE QN: <0.1 KU/L
PORK IGE QN: <0.1 KU/L
POTATO IGE QN: <0.1 KU/L
RICE IGE QN: <0.1 KU/L
RICKETTSIA RICKETTSII DNA, RT: NOT DETECTED
RYE IGE QN: <0.1 KU/L
SALMON IGE QN: <0.1 KU/L
SHRIMP IGE QN: <0.1 KU/L
SOYBEAN IGE QN: <0.1 KU/L
STRAWBERRY IGE QN: <0.1 KU/L
TEA IGE QN: <0.1 KU/L
TOMATO IGE QN: <0.1 KU/L
TUNA IGE QN: <0.1 KU/L
TURKEY MEAT IGE QN: <0.1 KU/L
VANILLA IGE QN: <0.1 KU/L
WHEAT IGE QN: <0.1 KU/L
WHOLE EGG IGE QN: <0.1 KU/L

## 2025-05-15 ENCOUNTER — RESULTS FOLLOW-UP (OUTPATIENT)
Dept: FAMILY MEDICINE CLINIC | Facility: CLINIC | Age: 35
End: 2025-05-15
Payer: COMMERCIAL

## 2025-05-15 DIAGNOSIS — M13.0 POLYARTHRITIS: Primary | ICD-10-CM

## 2025-05-15 DIAGNOSIS — M25.40 JOINT SWELLING: ICD-10-CM

## 2025-05-15 DIAGNOSIS — L30.9 DERMATITIS: Primary | ICD-10-CM

## 2025-05-21 RX ORDER — TRIAMCINOLONE ACETONIDE 5 MG/G
1 OINTMENT TOPICAL 2 TIMES DAILY
Qty: 30 G | Refills: 0 | Status: SHIPPED | OUTPATIENT
Start: 2025-05-21

## 2025-05-29 ENCOUNTER — PATIENT MESSAGE (OUTPATIENT)
Dept: FAMILY MEDICINE CLINIC | Facility: CLINIC | Age: 35
End: 2025-05-29
Payer: COMMERCIAL

## 2025-05-29 DIAGNOSIS — L30.9 DERMATITIS: Primary | ICD-10-CM

## 2025-05-29 RX ORDER — CLOBETASOL PROPIONATE 0.5 MG/G
1 EMULSION TOPICAL 2 TIMES DAILY
Qty: 60 G | Refills: 0 | Status: SHIPPED | OUTPATIENT
Start: 2025-05-29

## 2025-06-02 DIAGNOSIS — R06.2 WHEEZING: ICD-10-CM

## 2025-06-02 RX ORDER — ALBUTEROL SULFATE 90 UG/1
2 INHALANT RESPIRATORY (INHALATION) EVERY 4 HOURS PRN
Qty: 8.5 G | Refills: 0 | Status: SHIPPED | OUTPATIENT
Start: 2025-06-02

## 2025-06-06 ENCOUNTER — PATIENT MESSAGE (OUTPATIENT)
Dept: FAMILY MEDICINE CLINIC | Facility: CLINIC | Age: 35
End: 2025-06-06
Payer: COMMERCIAL

## 2025-06-06 DIAGNOSIS — R76.8 ANTI-TPO ANTIBODIES PRESENT: Primary | ICD-10-CM

## 2025-06-06 DIAGNOSIS — E11.69 TYPE 2 DIABETES MELLITUS WITH OTHER SPECIFIED COMPLICATION, WITHOUT LONG-TERM CURRENT USE OF INSULIN: ICD-10-CM

## 2025-06-09 RX ORDER — TIRZEPATIDE 10 MG/.5ML
INJECTION, SOLUTION SUBCUTANEOUS
Qty: 6 ML | Refills: 0 | Status: SHIPPED | OUTPATIENT
Start: 2025-06-09

## 2025-08-03 DIAGNOSIS — F41.9 ANXIETY: ICD-10-CM

## 2025-08-04 RX ORDER — FLUOXETINE HYDROCHLORIDE 40 MG/1
40 CAPSULE ORAL DAILY
Qty: 90 CAPSULE | Refills: 0 | Status: SHIPPED | OUTPATIENT
Start: 2025-08-04

## 2025-08-27 DIAGNOSIS — E11.69 TYPE 2 DIABETES MELLITUS WITH OTHER SPECIFIED COMPLICATION, WITHOUT LONG-TERM CURRENT USE OF INSULIN: ICD-10-CM

## 2025-08-27 RX ORDER — TIRZEPATIDE 10 MG/.5ML
10 INJECTION, SOLUTION SUBCUTANEOUS
Qty: 6 ML | Refills: 0 | Status: SHIPPED | OUTPATIENT
Start: 2025-08-27